# Patient Record
Sex: FEMALE | Race: BLACK OR AFRICAN AMERICAN | NOT HISPANIC OR LATINO | Employment: FULL TIME | URBAN - METROPOLITAN AREA
[De-identification: names, ages, dates, MRNs, and addresses within clinical notes are randomized per-mention and may not be internally consistent; named-entity substitution may affect disease eponyms.]

---

## 2018-08-17 ENCOUNTER — TELEPHONE (OUTPATIENT)
Dept: OBSTETRICS AND GYNECOLOGY | Facility: CLINIC | Age: 33
End: 2018-08-17

## 2018-08-17 ENCOUNTER — HOSPITAL ENCOUNTER (OUTPATIENT)
Dept: RADIOLOGY | Facility: OTHER | Age: 33
Discharge: HOME | End: 2018-08-17
Attending: OBSTETRICS & GYNECOLOGY
Payer: COMMERCIAL

## 2018-08-17 ENCOUNTER — OFFICE VISIT (OUTPATIENT)
Dept: OBSTETRICS AND GYNECOLOGY | Facility: CLINIC | Age: 33
End: 2018-08-17
Attending: OBSTETRICS & GYNECOLOGY
Payer: COMMERCIAL

## 2018-08-17 VITALS
SYSTOLIC BLOOD PRESSURE: 126 MMHG | DIASTOLIC BLOOD PRESSURE: 88 MMHG | HEIGHT: 64 IN | WEIGHT: 211.88 LBS | BODY MASS INDEX: 36.17 KG/M2

## 2018-08-17 DIAGNOSIS — D25.2 INTRAMURAL AND SUBSEROUS LEIOMYOMA OF UTERUS: ICD-10-CM

## 2018-08-17 DIAGNOSIS — D25.2 INTRAMURAL AND SUBSEROUS LEIOMYOMA OF UTERUS: Primary | ICD-10-CM

## 2018-08-17 DIAGNOSIS — D25.1 INTRAMURAL AND SUBSEROUS LEIOMYOMA OF UTERUS: ICD-10-CM

## 2018-08-17 DIAGNOSIS — D25.1 INTRAMURAL AND SUBSEROUS LEIOMYOMA OF UTERUS: Primary | ICD-10-CM

## 2018-08-17 PROCEDURE — 76830 TRANSVAGINAL US NON-OB: CPT | Mod: TC

## 2018-08-17 PROCEDURE — 76830 TRANSVAGINAL US NON-OB: CPT | Mod: 26,,, | Performed by: RADIOLOGY

## 2018-08-17 PROCEDURE — 76856 US EXAM PELVIC COMPLETE: CPT | Mod: 26,,, | Performed by: RADIOLOGY

## 2018-08-17 PROCEDURE — 99204 OFFICE O/P NEW MOD 45 MIN: CPT | Mod: S$GLB,,, | Performed by: OBSTETRICS & GYNECOLOGY

## 2018-08-17 PROCEDURE — 99999 PR PBB SHADOW E&M-NEW PATIENT-LVL III: CPT | Mod: PBBFAC,,, | Performed by: OBSTETRICS & GYNECOLOGY

## 2018-08-17 PROCEDURE — 3008F BODY MASS INDEX DOCD: CPT | Mod: CPTII,S$GLB,, | Performed by: OBSTETRICS & GYNECOLOGY

## 2018-08-17 RX ORDER — FUROSEMIDE 20 MG/1
20 TABLET ORAL 2 TIMES DAILY
COMMUNITY

## 2018-08-17 NOTE — PROGRESS NOTES
"CC: Symptoms related to fibroids    Alden Hanna is a 33 y.o. female  presents for a consultation for management of fibroids.      She reports cycles have gotten heavier in the last 6 months.  She has to use pads and tampons due to the heaviest.  She reports clots.  She has accidents.  She reports mild dysmenorrhea.    She reports being diagnosed with fibroids approximately 2-3 years ago in [place].      History reviewed. No pertinent past medical history.    History reviewed. No pertinent surgical history.    Family History   Problem Relation Age of Onset    Stomach cancer Maternal Grandfather     Breast cancer Neg Hx     Colon cancer Neg Hx     Ovarian cancer Neg Hx        Social History     Tobacco Use    Smoking status: Current Every Day Smoker     Types: Cigars    Smokeless tobacco: Never Used   Substance Use Topics    Alcohol use: Yes     Comment: occasional    Drug use: No       OB History    Para Term  AB Living   0 0 0 0 0 0   SAB TAB Ectopic Multiple Live Births   0 0 0 0 0             /88 (BP Location: Left arm, Patient Position: Sitting, BP Method: Large (Manual))   Ht 5' 4" (1.626 m)   Wt 96.1 kg (211 lb 13.8 oz)   LMP 2018 (Exact Date)   BMI 36.37 kg/m²     ROS:  GENERAL: Denies weight gain or weight loss. Feeling well overall.   SKIN: Denies rash or lesions.   HEAD: Denies head injury or headache.   NODES: Denies enlarged lymph nodes.   CHEST: Denies chest pain or shortness of breath.   CARDIOVASCULAR: Denies palpitations or left sided chest pain.   ABDOMEN: No abdominal pain, constipation, diarrhea, nausea, vomiting or rectal bleeding.   URINARY: No frequency, dysuria, hematuria, or burning on urination.  REPRODUCTIVE: See HPI.   HEMATOLOGIC: No easy bruisability or excessive bleeding with the exception of menstrual cycles.  MUSCULOSKELETAL: Denies joint pain or swelling.   NEUROLOGIC: Denies syncope or weakness.   PSYCHIATRIC: Denies depression, anxiety " or mood swings.    PHYSICAL EXAM:  APPEARANCE: Well nourished, well developed, in no acute distress.  AFFECT: WNL, alert and oriented x 3  SKIN: No acne or hirsutism  NECK: Neck symmetric without masses or thyromegaly  NODES: No inguinal, cervical, axillary, or femoral lymph node enlargement  CHEST: Good respiratory effect  ABDOMEN: Soft.  No tenderness or masses.  No hepatosplenomegaly.  No hernias.  PELVIC: Normal external genitalia without lesions.  Normal hair distribution.  Adequate perineal body, normal urethral meatus.  Vagina moist and well rugated without lesions or discharge.  Cervix pink, without lesions, discharge or tenderness.  No significant cystocele or rectocele.  Bimanual exam shows uterus to be enlarged, approximately 16 week size, irregular, mobile and nontender.  Adnexa without masses or tenderness.    EXTREMITIES: No edema.      ICD-10-CM ICD-9-CM    1. Intramural and subserous leiomyoma of uterus D25.1 218.1 TSH    D25.2 218.2 CBC auto differential      US Pelvis Comp with Transvag NON-OB (xpd         Patient was counseled today on treatment options for fibroids including low dose oral contraceptive pills, DepoLupron, Elagolix clinical trial, UFE, DaVinci assisted myomectomy, abdominal myomectomy, and hysterectomy.  R/B/A of each discussed.  Above test ordered.  Will email with results of above to determine the best course of action.

## 2018-08-17 NOTE — PATIENT INSTRUCTIONS
What Are Fibroids?  Fibroids are growths made up of connective tissue and muscle cells that usually form in the wall of your uterus. Other names for fibroids are myomas and leiomyomas. Fibroids are the most common tumor in women. They are almost always noncancerous (benign) and harmless. Fibroids start as pea-sized lumps, but can grow steadily during your reproductive years. Many fibroids just need to be watched. Others may need treatment if they become too large or cause symptoms.    Potential problems  Fibroids often cause no symptoms. But a fibroid that grows quickly in your uterus can cause 1 or more of the following problems:  · Excessive uterine bleeding, leading to anemia (lack of red blood cells)  · Frequent urge to urinate  · Difficulty having bowel movements  · Achiness, heaviness, or fullness  · Back or abdominal pain  · Pain during intercourse  · Difficulty getting pregnant or being unable to get pregnant  · Problems with pregnancy  · Enlargement of the lower abdomen  Treatment is tailored for you  No 2 fibroids are the same. The type of treatment you will have depends on their number, size, location, and rate of growth. Your treatment decision also depends on the severity of your symptoms and whether or not you plan to have children in the future. There are a growing number of effective ways to treat fibroids. After your medical evaluation, your health care provider will be able to discuss with you the best options to solve your particular problem and meet your needs.  Your future checkups  Treating your fibroids is likely to relieve your symptoms. But your health care provider will want to check your progress. Ask your health care provider about any additional follow-up visits you might need.   Date Last Reviewed: 5/10/2015  © 2338-6315 [employer]. [address] [place] [address], [place] [address]. All rights reserved. This information is not intended as a substitute for professional medical  care. Always follow your healthcare professional's instructions.        Having Robotic-Assisted Myomectomy  Robotic-assisted myomectomy is a type of surgery. Its done to remove growths in a womens womb (uterus) called fibroid tumors. The tumors are not cancer. The surgery is done with special tools and a robotic controller.  What to tell your healthcare provider  Tell your healthcare provider about all the medicines you take. This includes over-the-counter medicines such as ibuprofen. It also includes vitamins, herbs, and other supplements. And tell your healthcare provider if you:  · Have had any recent changes in your health, such as an infection or fever  · Are sensitive or allergic to any medicines, latex, tape, or anesthesia (local and general)  · Are pregnant or think you may be pregnant  · Plan to get pregnant after having this surgery  Tests before your surgery  Before your surgery, a healthcare provider will ask you questions about your health. He or she will also examine you. This includes checking your heart and lungs. You may need tests such as:  · Electrocardiogram to check your heart rhythm  · Ultrasound exam of your pelvis to look at your fibroids  · MRI to get more information about your fibroids  · Blood tests to check your overall health  Getting ready for your surgery  Talk with your healthcare provider how to get ready for your surgery. You may need to stop taking some medicines before the procedure, such as blood thinners and aspirin. If you smoke, you may need to stop before your surgery. Smoking can delay healing. Talk with your healthcare provider if you need help to stop smoking.  Also, make sure to:  · Ask a family member or friend to take you home from the hospital  · Not eat or drink after midnight the night before your surgery  · Follow all other instructions from your healthcare provider  You will be asked to sign a consent form that gives your permission to do the procedure. Read the  form carefully. Ask questions if something is not clear.  On the day of your surgery  Your doctor will explain the details of your surgery. Your surgery will be done by an obstetrician/gynecologist (OB/GYN) surgeon. He or she will work with a team of specialized nurses. The surgery can be done in several ways. Ask your doctor about the details of your surgery. The whole procedure may take a couple of hours. In general, you can expect the following:  · You will have general anesthesia, a medicine that allows you to sleep through the surgery. You wont feel any pain during the surgery.  · A healthcare provider will watch your vital signs, like your heart rate and blood pressure, during the surgery.  · You may be given antibiotics during and after the surgery. This is to help prevent infection.  · After cleaning your skin, the surgeon will make a few small cuts (incisions) in your abdomen.  · A small tube may be used to send some gas into your abdomen. This helps the surgeon see the area better during surgery.  · the surgeon will pass tools through the small incisions. These include a tiny camera with a light, and several robotic tools. The robotic tools allow the surgeon to make very small movements.  · The surgeon will use the robotic controller to move the tools and remove the fibroids.  · When the surgery is done, the tools will be removed. The incisions will be closed and bandaged.  After your surgery  After the surgery, a healthcare provider will watch your vital signs. He or she will also check your incisions. You may be able to go home the same day. Or you may need to stay overnight.  Recovering at home  Make sure you move around as much as possible after your surgery. This helps to prevent problems like blood clots. You may have some pain after the surgery. This includes pain in your shoulder from the gas used during surgery. Your healthcare provider will tell you what to take for pain.  Follow-up care  Make  sure you follow all of your healthcare providers instructions. Dont miss any of your follow-up appointments. Your fibroid symptoms may go away after surgery. Fibroids can grow back or new ones can form. Talk with your healthcare provider if your symptoms return. Tell your provider if you get pregnant after having this surgery.  When to call your healthcare provider  Call your healthcare provider right away if you have any of these:  · Fever of 100.4°F (38.0°C) or higher  · Pain that is getting worse  · Redness or warmth near the incisions  · Vaginal bleeding  · Lots of fluid leaking from your incisions   Date Last Reviewed: 8/10/2015  © 9834-9389 [employer]. [address] [place] [address], [place] [address]. All rights reserved. This information is not intended as a substitute for professional medical care. Always follow your healthcare professional's instructions.        Myomectomy  Myomectomy is a surgical procedure to remove uterine fibroids. This procedure may preserve your uterus and your ability to have children.  Before your surgery  Depending on which procedure you and your healthcare provider choose, you may be asked to do the following:  · Have tests that your health care provider has ordered.  · Stop eating and drinking at midnight before your surgery, or as recommended by your healthcare provider.  · Take medicines as prescribed by your healthcare provider to shrink fibroids.  · Stop taking aspirin or ibuprofen 1 week before surgery. Tell your healthcare provider what other medicines you take and ask whether you should stop them.  · Arrange for a ride home after surgery.    Types of myomectomy procedures  Abdominal  Your healthcare provider makes incisions in your stomach and uterus to remove the fibroids. Then the uterus is repaired and the incisions are closed.  Laparoscopic  Your healthcare provider inserts a laparoscope and other surgical instruments through half-inch incisions in your  stomach. One or more incisions are made in your uterus to remove the fibroids. Then the uterus is repaired and the incisions are closed.  Hysteroscopic  A hysteroscope is inserted into the vagina. An instrument is used to remove the fibroids from your uterus.  Call your healthcare provider  Contact your healthcare provider right away if you have any of the following:  · Severe or increasing pain  · Fever or chills  · Nausea or vomiting  · Redness or swelling around your incision  · Persistent or heavy vaginal bleeding   Date Last Reviewed: 2/1/2017 © 2000-2017 [employer]. [address] [place] [address]. All rights reserved. This information is not intended as a substitute for professional medical care. Always follow your healthcare professional's instructions.

## 2018-08-20 ENCOUNTER — TELEPHONE (OUTPATIENT)
Dept: OBSTETRICS AND GYNECOLOGY | Facility: CLINIC | Age: 33
End: 2018-08-20

## 2018-08-20 NOTE — TELEPHONE ENCOUNTER
Contacted the pt to inform her of her US results per Dr. Stewart's note. Pt informed that the US shows multiple fibroids.  The largest seems to be about 5 cm.Pt informed that if she is interested in considering a robotic myomectomy, Dr. Stewart will need to order an MRI. Pt stated that she would like to have the MRI done. Pt informed that Dr. Stewart will be informed. Pt stated that she will call the office back to schedule the appointment. Pt verbalized understanding.

## 2018-08-24 ENCOUNTER — TELEPHONE (OUTPATIENT)
Dept: OBSTETRICS AND GYNECOLOGY | Facility: CLINIC | Age: 33
End: 2018-08-24

## 2018-08-24 DIAGNOSIS — D25.1 INTRAMURAL AND SUBSEROUS LEIOMYOMA OF UTERUS: ICD-10-CM

## 2018-08-24 DIAGNOSIS — D25.2 INTRAMURAL AND SUBSEROUS LEIOMYOMA OF UTERUS: ICD-10-CM

## 2018-08-24 NOTE — TELEPHONE ENCOUNTER
----- Message from Rebecca Benjamin sent at 8/24/2018  9:33 AM CDT -----  Contact: ARTEM ROSS [[id number]]            Name of Who is Calling: ARTEM ROSS [[id number]]      What is the request in detail: patient would like codes procedures previously discuss . Please call     Can the clinic reply by [facility]: no    What Number to Call Back if not in [facility]:[phone]

## 2018-08-24 NOTE — TELEPHONE ENCOUNTER
Returned the pt's call to the clinic. Pt needed the CPT codes for the robotic myomectomy procedure 25540 and MRI of her pelvis 70617. Pt verbalized understanding.

## 2018-08-24 NOTE — TELEPHONE ENCOUNTER
Contacted the pt to inform her that her US shows multiple fibroids per Dr. Stewart. Informed the pt that the largest seems to be about 5 cm and If she is interested in considering a robotic myomectomy, Dr. Stewart will need to order an MRI. Pt informed that the MRI is ordered all she needs to do is schedule the appointment. Pt requested information about how much it'll cost for her to have the MRI and the robotic myomectomy. Informed the pt of patient artie number so that she can make her inquiries. Pt verbalized understanding.

## 2018-09-11 ENCOUNTER — TELEPHONE (OUTPATIENT)
Dept: OBSTETRICS AND GYNECOLOGY | Facility: CLINIC | Age: 33
End: 2018-09-11

## 2018-09-11 NOTE — TELEPHONE ENCOUNTER
----- Message from Marva Mon sent at 9/11/2018  8:45 AM CDT -----  Contact: pt            Name of Who is Calling: ARTEM ROSS [[id number]]    What is the request in detail: pt calling to schedule MRI.. Please advise      Can the clinic reply by [facility]: yes      What Number to Call Back if not in [facility]: [phone]

## 2018-09-11 NOTE — TELEPHONE ENCOUNTER
Hello, this is Juan Pablo calling from the OBGYN clinic at [facility]. Returning the pt's call to the clinic. ( No answer, unable to leave VM message. Mailbox was full. The order for the MRI is already placed the patient just needs to schedule the appointment. Please schedule the patient.)

## 2018-09-14 ENCOUNTER — OFFICE VISIT (OUTPATIENT)
Dept: OBSTETRICS AND GYNECOLOGY | Facility: CLINIC | Age: 33
End: 2018-09-14
Attending: OBSTETRICS & GYNECOLOGY
Payer: COMMERCIAL

## 2018-09-14 VITALS
BODY MASS INDEX: 36.4 KG/M2 | WEIGHT: 213.19 LBS | DIASTOLIC BLOOD PRESSURE: 82 MMHG | HEIGHT: 64 IN | SYSTOLIC BLOOD PRESSURE: 112 MMHG

## 2018-09-14 DIAGNOSIS — D25.0 INTRAMURAL, SUBMUCOUS, AND SUBSEROUS LEIOMYOMA OF UTERUS: ICD-10-CM

## 2018-09-14 DIAGNOSIS — D25.2 INTRAMURAL, SUBMUCOUS, AND SUBSEROUS LEIOMYOMA OF UTERUS: ICD-10-CM

## 2018-09-14 DIAGNOSIS — D25.1 INTRAMURAL, SUBMUCOUS, AND SUBSEROUS LEIOMYOMA OF UTERUS: ICD-10-CM

## 2018-09-14 DIAGNOSIS — Z01.419 VISIT FOR GYNECOLOGIC EXAMINATION: Primary | ICD-10-CM

## 2018-09-14 PROCEDURE — 99395 PREV VISIT EST AGE 18-39: CPT | Mod: S$GLB,,, | Performed by: OBSTETRICS & GYNECOLOGY

## 2018-09-14 PROCEDURE — 88175 CYTOPATH C/V AUTO FLUID REDO: CPT

## 2018-09-14 PROCEDURE — 87624 HPV HI-RISK TYP POOLED RSLT: CPT

## 2018-09-14 PROCEDURE — 99999 PR PBB SHADOW E&M-EST. PATIENT-LVL III: CPT | Mod: PBBFAC,,, | Performed by: OBSTETRICS & GYNECOLOGY

## 2018-09-14 NOTE — PROGRESS NOTES
"CC: Well woman exam    Alden Hanna is a 33 y.o. female  presents for a well woman exam.  She has no issues, problems, or complaints.    She was previously seen for symptoms related to fibroids.      She reports cycles have gotten heavier in the last 6 months.  She has to use pads and tampons due to the heaviest.  She reports clots.  She has accidents.  She reports mild dysmenorrhea.     Ultrasound showed:    FINDINGS:  The uterus is heterogeneous in echotexture and enlarged, measuring 10.1 x 5.2 x 4.8 cm.  The endometrium is unremarkable, measuring 0.6 cm in thickness.  The cervix is unremarkable.    The uterus demonstrates several intramural fibroids.  The largest is in the left aspect of the lower uterine pole, measuring 5.1 cm.    The ovaries are unremarkable.  The right ovary measures 3.3 x 4.1 x 2.0 cm, and the left measures 3.5 x 2.9 x 3.0 cm.    Duplex Doppler images show symmetric and expected bilateral ovarian vascularity.            History reviewed. No pertinent past medical history.    History reviewed. No pertinent surgical history.    OB History    Para Term  AB Living   0 0 0 0 0 0   SAB TAB Ectopic Multiple Live Births   0 0 0 0 0             Family History   Problem Relation Age of Onset    Stomach cancer Maternal Grandfather     Breast cancer Neg Hx     Colon cancer Neg Hx     Ovarian cancer Neg Hx        Social History     Tobacco Use    Smoking status: Current Every Day Smoker     Types: Cigars    Smokeless tobacco: Never Used   Substance Use Topics    Alcohol use: Yes     Comment: occasional    Drug use: No       /82 (BP Location: Left arm, Patient Position: Sitting, BP Method: Large (Manual))   Ht 5' 4" (1.626 m)   Wt 96.7 kg (213 lb 3 oz)   LMP 2018   BMI 36.59 kg/m²     ROS:  GENERAL: Denies weight gain or weight loss. Feeling well overall.   SKIN: Denies rash or lesions.   HEAD: Denies head injury or headache.   NODES: Denies enlarged lymph " nodes.   CHEST: Denies chest pain or shortness of breath.   CARDIOVASCULAR: Denies palpitations or left sided chest pain.   ABDOMEN: No abdominal pain, constipation, diarrhea, nausea, vomiting or rectal bleeding.   URINARY: No frequency, dysuria, hematuria, or burning on urination.  REPRODUCTIVE: See HPI.   BREASTS: The patient performs breast self-examination and denies pain, lumps, or nipple discharge.   HEMATOLOGIC: No easy bruisability or excessive bleeding.  MUSCULOSKELETAL: Denies joint pain or swelling.   NEUROLOGIC: Denies syncope or weakness.   PSYCHIATRIC: Denies depression, anxiety or mood swings.    Physical Exam:    APPEARANCE: Well nourished, well developed, in no acute distress.  AFFECT: WNL, alert and oriented x 3  SKIN: No acne or hirsutism  NECK: Neck symmetric without masses or thyromegaly  NODES: No inguinal, cervical, axillary, or femoral lymph node enlargement  CHEST: Good respiratory effect  ABDOMEN: Soft.  No tenderness or masses.  No hepatosplenomegaly.  No hernias.  BREASTS: Symmetrical, no skin changes or visible lesions.  No palpable masses, nipple discharge bilaterally.  PELVIC: Normal external genitalia without lesions.  Normal hair distribution.  Adequate perineal body, normal urethral meatus.  Vagina moist and well rugated without lesions or discharge.  Cervix pink, without lesions, discharge or tenderness.  No significant cystocele or rectocele.  Bimanual exam shows uterus to be normal size, regular, mobile and nontender.  Adnexa without masses or tenderness.    EXTREMITIES: No edema.    ASSESSMENT AND PLAN  1. Visit for gynecologic examination  Liquid-based pap smear, screening    HPV High Risk Genotypes, PCR   2. Intramural, submucous, and subserous leiomyoma of uterus         Patient was counseled today on A.C.S. Pap guidelines and recommendations for yearly pelvic exams, pap smears every 5 years, and monthly self breast exams; to see her PCP for other health maintenance.      Patient was counseled today on treatment options for fibroids including low dose oral contraceptive pills, DepoLupron, Elagolix clinical trial, UFE, DaVinci assisted myomectomy, abdominal myomectomy, and hysterectomy.  R/B/A of each discussed.  Patient is interested in robotic   Myomectomy.  MRI ordered to determine if she is a candidate.  Discussed the risk of recurrence of fibroids, the need to delay conception for 4-6 months and the possible need for  for deliver    Follow-up in about 1 year (around 2019).

## 2018-09-19 LAB
HPV HR 12 DNA CVX QL NAA+PROBE: NEGATIVE
HPV16 AG SPEC QL: NEGATIVE
HPV18 DNA SPEC QL NAA+PROBE: NEGATIVE

## 2018-09-21 ENCOUNTER — HOSPITAL ENCOUNTER (OUTPATIENT)
Dept: RADIOLOGY | Facility: OTHER | Age: 33
Discharge: HOME | End: 2018-09-21
Attending: OBSTETRICS & GYNECOLOGY
Payer: COMMERCIAL

## 2018-09-21 DIAGNOSIS — D25.2 INTRAMURAL AND SUBSEROUS LEIOMYOMA OF UTERUS: ICD-10-CM

## 2018-09-21 DIAGNOSIS — D25.1 INTRAMURAL AND SUBSEROUS LEIOMYOMA OF UTERUS: ICD-10-CM

## 2018-09-21 PROCEDURE — 72197 MRI PELVIS W/O & W/DYE: CPT | Mod: TC

## 2018-09-21 PROCEDURE — 72197 MRI PELVIS W/O & W/DYE: CPT | Mod: 26,,, | Performed by: RADIOLOGY

## 2018-09-21 PROCEDURE — 25500020 PHARM REV CODE 255: Performed by: OBSTETRICS & GYNECOLOGY

## 2018-09-21 PROCEDURE — A9585 GADOBUTROL INJECTION: HCPCS | Performed by: OBSTETRICS & GYNECOLOGY

## 2018-09-21 RX ORDER — GADOBUTROL 604.72 MG/ML
10 INJECTION INTRAVENOUS
Status: COMPLETED | OUTPATIENT
Start: 2018-09-21 | End: 2018-09-21

## 2018-09-21 RX ADMIN — GADOBUTROL 10 ML: 604.72 INJECTION INTRAVENOUS at 04:09

## 2019-05-30 ENCOUNTER — TELEPHONE (OUTPATIENT)
Dept: RESEARCH | Facility: OTHER | Age: 34
End: 2019-05-30

## 2019-05-30 ENCOUNTER — OFFICE VISIT (OUTPATIENT)
Dept: OBSTETRICS AND GYNECOLOGY | Facility: CLINIC | Age: 34
End: 2019-05-30
Attending: OBSTETRICS & GYNECOLOGY
Payer: COMMERCIAL

## 2019-05-30 VITALS
SYSTOLIC BLOOD PRESSURE: 112 MMHG | BODY MASS INDEX: 33.69 KG/M2 | WEIGHT: 197.31 LBS | DIASTOLIC BLOOD PRESSURE: 74 MMHG | HEIGHT: 64 IN

## 2019-05-30 DIAGNOSIS — N63.10 BREAST MASS, RIGHT: Primary | ICD-10-CM

## 2019-05-30 DIAGNOSIS — N92.0 MENORRHAGIA WITH REGULAR CYCLE: ICD-10-CM

## 2019-05-30 PROCEDURE — 99999 PR PBB SHADOW E&M-EST. PATIENT-LVL III: ICD-10-PCS | Mod: PBBFAC,,, | Performed by: OBSTETRICS & GYNECOLOGY

## 2019-05-30 PROCEDURE — 99213 PR OFFICE/OUTPT VISIT, EST, LEVL III, 20-29 MIN: ICD-10-PCS | Mod: S$GLB,,, | Performed by: OBSTETRICS & GYNECOLOGY

## 2019-05-30 PROCEDURE — 99213 OFFICE O/P EST LOW 20 MIN: CPT | Mod: S$GLB,,, | Performed by: OBSTETRICS & GYNECOLOGY

## 2019-05-30 PROCEDURE — 3008F BODY MASS INDEX DOCD: CPT | Mod: CPTII,S$GLB,, | Performed by: OBSTETRICS & GYNECOLOGY

## 2019-05-30 PROCEDURE — 3008F PR BODY MASS INDEX (BMI) DOCUMENTED: ICD-10-PCS | Mod: CPTII,S$GLB,, | Performed by: OBSTETRICS & GYNECOLOGY

## 2019-05-30 PROCEDURE — 99999 PR PBB SHADOW E&M-EST. PATIENT-LVL III: CPT | Mod: PBBFAC,,, | Performed by: OBSTETRICS & GYNECOLOGY

## 2019-05-30 RX ORDER — TRANEXAMIC ACID 650 MG/1
1300 TABLET ORAL 3 TIMES DAILY
Qty: 30 TABLET | Refills: 0 | Status: SHIPPED | OUTPATIENT
Start: 2019-05-30 | End: 2019-06-04

## 2019-05-30 NOTE — PROGRESS NOTES
"CC: Breast mass    Alden Hanna is a 34 y.o. female  presents for a consultation for management of fibroids and breast mass.      She reports a mass in her right breast since April.  No nipple discharge.  Had tenderness but has improved.      She reports cycles are still heavy and interested in discussing medication options again.      History reviewed. No pertinent past medical history.    History reviewed. No pertinent surgical history.    Family History   Problem Relation Age of Onset    Stomach cancer Maternal Grandfather     Breast cancer Neg Hx     Colon cancer Neg Hx     Ovarian cancer Neg Hx        Social History     Tobacco Use    Smoking status: Current Every Day Smoker     Types: Cigars    Smokeless tobacco: Never Used   Substance Use Topics    Alcohol use: Yes     Comment: occasional    Drug use: No       OB History    Para Term  AB Living   0 0 0 0 0 0   SAB TAB Ectopic Multiple Live Births   0 0 0 0 0       /74   Ht 5' 4" (1.626 m)   Wt 89.5 kg (197 lb 5 oz)   LMP 2019   BMI 33.87 kg/m²     ROS:  GENERAL: Denies weight gain or weight loss. Feeling well overall.   SKIN: Denies rash or lesions.   HEAD: Denies head injury or headache.   NODES: Denies enlarged lymph nodes.   CHEST: Denies chest pain or shortness of breath.   BREASTS: The patient performs breast self-examination. Reports a painful lump in the right breast, no nipple discharge.   HEMATOLOGIC: No easy bruisability or excessive bleeding with the exception of menstrual cycles.    PHYSICAL EXAM:  APPEARANCE: Well nourished, well developed, in no acute distress.  AFFECT: WNL, alert and oriented x 3  SKIN: No acne or hirsutism  NECK: Neck symmetric without masses or thyromegaly  NODES: No inguinal, cervical, axillary, or femoral lymph node enlargement  CHEST: Good respiratory effect  BREASTS: Symmetrical, no skin changes or visible lesions.  No palpable masses, nipple discharge bilaterally.      " ICD-10-CM ICD-9-CM    1. Breast mass, right N63.10 611.72 US Breast Right Complete   2. Menorrhagia with regular cycle N92.0 626.2 tranexamic acid (LYSTEDA) 650 mg tablet     No palpable mass but ultrasound ordered to rule out abnormalities.      Discussed medication options for menorrhagia/fibroids - OCP, Lysteda, Elagolix clinical trial.  She is interested in the clinical trial but would like to start Lysteda until then.  R/B of each discussed.

## 2019-05-30 NOTE — TELEPHONE ENCOUNTER
As a follow-up to Dr. Stewart's request, BUSTER Mccarthy contacted patient to discuss [employer]  Uterine Fibroids Research Study. Subject expressed an interest.  Consent and Screening scheduled for 13 Jun 2019 @ 12:00 noon.

## 2019-06-12 ENCOUNTER — TELEPHONE (OUTPATIENT)
Dept: OBSTETRICS AND GYNECOLOGY | Facility: CLINIC | Age: 34
End: 2019-06-12

## 2019-06-13 ENCOUNTER — RESEARCH ENCOUNTER (OUTPATIENT)
Dept: RESEARCH | Facility: OTHER | Age: 34
End: 2019-06-13

## 2019-06-13 ENCOUNTER — HOSPITAL ENCOUNTER (OUTPATIENT)
Dept: RADIOLOGY | Facility: OTHER | Age: 34
Discharge: HOME | End: 2019-06-13
Attending: OBSTETRICS & GYNECOLOGY
Payer: COMMERCIAL

## 2019-06-13 ENCOUNTER — OFFICE VISIT (OUTPATIENT)
Dept: OBSTETRICS AND GYNECOLOGY | Facility: CLINIC | Age: 34
End: 2019-06-13
Attending: OBSTETRICS & GYNECOLOGY
Payer: COMMERCIAL

## 2019-06-13 VITALS
WEIGHT: 200.63 LBS | WEIGHT: 200.38 LBS | DIASTOLIC BLOOD PRESSURE: 78 MMHG | RESPIRATION RATE: 16 BRPM | HEIGHT: 64 IN | SYSTOLIC BLOOD PRESSURE: 116 MMHG | HEART RATE: 80 BPM | HEIGHT: 64 IN | SYSTOLIC BLOOD PRESSURE: 120 MMHG | BODY MASS INDEX: 34.21 KG/M2 | BODY MASS INDEX: 34.25 KG/M2 | TEMPERATURE: 99 F | DIASTOLIC BLOOD PRESSURE: 80 MMHG

## 2019-06-13 DIAGNOSIS — Z00.6 RESEARCH STUDY PATIENT: Primary | ICD-10-CM

## 2019-06-13 DIAGNOSIS — N63.10 BREAST MASS, RIGHT: ICD-10-CM

## 2019-06-13 PROCEDURE — 99499 UNLISTED E&M SERVICE: CPT | Mod: S$GLB,,, | Performed by: OBSTETRICS & GYNECOLOGY

## 2019-06-13 PROCEDURE — 99999 PR PBB SHADOW E&M-EST. PATIENT-LVL III: ICD-10-PCS | Mod: PBBFAC,,, | Performed by: OBSTETRICS & GYNECOLOGY

## 2019-06-13 PROCEDURE — 76642 US BREAST RIGHT LIMITED: ICD-10-PCS | Mod: 26,RT,, | Performed by: RADIOLOGY

## 2019-06-13 PROCEDURE — 99499 NO LOS: ICD-10-PCS | Mod: S$GLB,,, | Performed by: OBSTETRICS & GYNECOLOGY

## 2019-06-13 PROCEDURE — 76642 ULTRASOUND BREAST LIMITED: CPT | Mod: 26,RT,, | Performed by: RADIOLOGY

## 2019-06-13 PROCEDURE — 99999 PR PBB SHADOW E&M-EST. PATIENT-LVL III: CPT | Mod: PBBFAC,,, | Performed by: OBSTETRICS & GYNECOLOGY

## 2019-06-13 PROCEDURE — 76642 ULTRASOUND BREAST LIMITED: CPT | Mod: TC,RT

## 2019-06-13 NOTE — PROGRESS NOTES
Date:  13 JUN 2019 @ 2:00pm  Study:  Phase 3b Study to Evaluate the Long-Term Safety and Efficacy of Elagolix in Combination with Estradiol/Norethindrone Acetate for the Management of Heavy Menstrual Bleeding Associated with Uterine Fibroids in Premenopausal Women.           Sponsor:  [employer]   IRB/Protocol #: 2017.414 (Approved 01/22/2019)  : Lisa Stewart MD  Sub-Investigators:  Magnus Roa MD and Ant Briscoe MD  Site Number: 767   Visit:  Consent & Initial Screening Study Visit       Assessment/Procedure for Consent & Initial Screening Visit on 13 JUN 2019 @ 2:00pm  Exam Room #10     Informed Consent Documentation:  Before any activities or procedures were conducted, BUSTER Mccarthy met with Alden Hanna on 13-JUN-2019 @ 2:00pm in the Clinical Trials Unit (CTU), Exam Room #10 to consent Subject for [employer]   (IRB #: 2017.414 approved 01/22/2019) a Phase 3b Study to Evaluate the Long-Term Safety and Efficacy of Elagolix in Combination with Estradiol/Norethindrone Acetate for the Management of Heavy Menstrual Bleeding Associated with Uterine Fibroids in Premenopausal Women. BUSTER Mccarthy allowed ample time for subject to review consent and ask questions before signing consent.  The Subject verbally agreed to continue participating in the study and signed Informed Consent.    A copy of signed consent was given to Subject and uploaded into Epic.    Demographics:  YOB: 1975  Race:  Black or   Ethnicity:  Not  or     Tobacco and Alcohol Use:  Tobacco Use:  Current use of: Cigars (3 times a year)   Alcohol Use:  Number of drinks/Week consumed 1    Non-Gynecological Medical/Surgical History:  Condition/Diagnosis:  Lesion    Specify:  Lesion removed off of left ankle    Onset Date:  Fall 1996    Ongoing?  No.  Date:  Fall 1996    Is subject currently requiring treatment?  No    Gynecological Medical/Surgical History:   Abnormal Pap smear    Onset Date:  MAR 2004     Currently Symptomatic or Requiring Treatment?  No  Dysmenorrhea    Onset Date:  2015    Currently Symptomatic or Requiring Treatment?  Yes  Fibroids    Onset Date:  MAR 2015     Currently Symptomatic or Requiring Treatment?  Yes  Menorrhagia/heavy menstrual bleeding    Onset Date:  OCT 2018    Currently Symptomatic or Requiring Treatment?  Yes  Pelvic cramping    Onset Date:  OCT 2018    Currently Symptomatic or Requiring Treatment?  Yes  Pelvic pain    Onset Date:  OCT 2018    Currently Symptomatic or Requiring Treatment?  Yes  Pelvic pressure    Onset Date:  OCT 2018    Currently Symptomatic or Requiring Treatment?  Yes    Menstrual/Obstetrical History:    Average cycle length over last 6 months:  28 days.  Number of days bleedin    Typical intensity of menstrual periods:  Heavy    Has the subject ever been pregnant?  No    Complete Physical Examination:  Assessment conducted by Dr. Lisa Stewart on 2019 @ 4:00pm.  See Dr. Stewart's Epic note.  Appearance: Normal  Skin:  Normal  EENT: Normal   Head/Neck/Thyroid: Normal  Lymphatic: Normal  Cardiovascular: Normal  Lungs/Chest: Normal  Abdomen: Normal  Genitourinary:  Gynecological (pelvic/breast):  Refer to Gynecological Exam:  Extremities: Normal  Musculoskeletal: Normal  Neurologic: Normal  Other abnormalities, Please specify:  None.     Gynecological Examination:  Assessment conducted by Dr. Lisa Stewart on 2019 @ 4:00pm.  Please see her notes in Epic.      Site/System Appearance      Assessment      Breast                                      Normal   Pelvic                                       Abnormal; Enlarged Uterus            External Genitalia                    Normal     Vital Signs Assessed on 2019 at 2:53pm:  Systolic blood pressure:  Mm Hg 120  Diastolic blood pressure:  Mm Hg 80  Pulse:  80 beats/min  Respiratory Rate: 16 (breaths/min)  Temperature 98.5 F  Weight:  " 90.9kg  200 lb 6.4oz  Height:  5'4"in (165.5 cm)    12-lead Electrocardiogram (to be collected prior to scheduled blood collections:  12-lead Electrocardiogram will be performed at later date to be determined.    Mammogram (Subject who will be 39 years of age or older at the time of randomization, unless a mammogram was performed within 3 months prior to Screening).  Subject is 34 years of age and will not require a Mammogram to be performed.     Pap Test:  Pap Test was performed by Dr. Lisa Stewart on 13 JUN 2019 @ 4:00pm.  Please refer to her Epic Notes.  Pap test sample was collected.  Due to late time of examination performed, sample was collected and processed according to the central laboratory manual and shipped via [employer], on 14-JUN-2019.  Tracking #: [id number]  [id number]; Confirmation #: [id number].   Lab results will be placed in chart once received from [facility].     Endometrial Biopsy (Subject must have confirmed negative pregnancy test within 24 hours prior to biopsy:  Subject had a negative urine pregnancy test within 24 hours prior to the biopsy.  Endometrial Biopsy was conducted by Dr. Lisa Adamson on 13 JUN 2019 @ 4:00pm.  Please refer to her Epic Note.  Due to late time of examination performed, sample was collected and processed according to the central laboratory manual and shipped via [facility], on 14-JUN-2019.  Tracking #: [id number]  [id number]; Confirmation #: [id number].   Lab results will be placed in chart once received from [facility].       Pelvic Ultrasound /TAU, TVU:  Pelvic Ultrasound / TAU/TVU was conducted by Mai Gramajo in [address] after Dr. Stewart completed her examinations.  Images were uploaded and submitted per protocol to [facility].  Results of images once received will be filed in subject's chart.    Saline Infusion Sonohysterography (SIS) (Subject must have confirmed negative pregnancy test within 24 hours prior to SIS).  SIS will be performed at later date to be " determined.      MRI (If requested by Central Imaging Vendor):  N/A.    Bone Mineral Density (BMD) DXA Scan:  DXA will be performed at later date to be determined.    Dispense Sanitary Products and Collection Kit (Keg, Collection Bags, etc.:  Subject was dispensed the follow sanitary products.  Tampons Super- (1 Box); Tampons regular (2 Boxes); Tampons Super Plus (2 Boxes).  Always Ultra Thin Overnight- (2 packs); [place] panti-liner extra long- (1 Pack).  Subject was dispensed 1- collection kit (keg, collection bags and form).  BUSTER Mccarthy reviewed the product collection forms and explained the product collection visits with Subject.  Subject verbally expressed that she understood.    Optional Urine Test for Gonorrhea and Chlamydia (based on Investigator's discretion prior to subject undergoing endometrial biopsy.  Positive results will be treated outside of the protocol:  N/A     Clinical Laboratory/Safety Lab Tests:  Subject's samples were drawn at 2:25pm.  Subject did not fast.  Hematology, Chemistry, Vitamin D, Urinalysis, Lipid Panel, Serology, Endocrine (FSH & Reflexive TSH).  All ambient samples were processed according to the central laboratory manual and shipped via [email or web address] on 13 JUN 2019.  Tracking #: [id number]  [id number]; Confirmation #: [id number].  Frozen samples: 25-Hydroxy Vitamin D and HCV RNA Ampliprep Taqman were shipped via [facility] [[email or web address]], Tracking #: [id number]; Confirmation #: [id number]     Urine Pregnancy Test:  Pregnancy test conducted at 2:05pm; result- negative.  hcg Cassette Rapid Test  Catalog Number:  [id number]  Lot #: [id number]   Expiration Date: 2020-02-29.  Subject was dispensed home pregnancy test kits to self-administer during the Screening Period.    Serum Pregnancy Test:  N/A.     Contraception Counseling/Dispense Contraceptives:   Subject is not required to use dual contraception methods because she practices total abstinence from sexual intercourse, as her preferred  lifestyle.  Subject did not want any contraceptives.  BUSTER Mccarthy reminded subject that she can not be pregnant while participating in the study.     Dispense E-Diary:  BUSTER Mccarthy gave Subject her [facility] Desire 320 eDiary.  BUSTER Mccarthy trained subject on how to use her eDiary. Subject displayed knowledge of navigating the eDiary and entering data.        [place] IRT (Interactive Response Technology):  BUSTER Mccarthy registered subject in [employer] with status entered as Screening.  Subject was assigned ID#:  [id number].     Serious Adverse Event (SANJUANA) and Protocol-Related Non-Serious Adverse Event (AE) Assessment: Subject did not report any AEs or SAEs during this visit.     Prior Uterine Fibroid Medication History (Hormonal and Non-Hormonal):  Subject reported that she is not on birth control.  Subject has not taken any Prior Uterine Fibroid Medication (Hormonal and Non-Hormonal during the last six months.     Concomitant Medication Review:  Concomitant medications were assessed and noted on Concomitant Medications Log.      C-SSRS:  BUSTER Mccarthy accessed C-SSRS on the [employer].  C-SSRS was completed.     Inclusion/Exclusion Criteria:  Will be completed throughout Screening Period.    Screen Failure Documentation:  Will be document if Subject is a screen failure.       ClinCard:  Subject was registered in FND with Subject ID#: [id number].  [place] was given Subject.   Stipend will be given for Initial Screening: $75; Pap smear: $50; Endometrial Biopsy: $50 and TVA/TVU: $50.  Total amount: $225.     Subject visit completed at 3:50pm. Subject denies any questions or concerns at this time.  BUSTER Mccarthy reminded Subject that the following procedures needed to be scheduled:  SIS and DXA.

## 2019-06-13 NOTE — PROGRESS NOTES
"CC: Menorrhagia, fibroids    Alden Hanna is a 34 y.o. female  presents for initial exam for Phase III Clinical trial for Elagolix.      Patient reports a history of heavy menstrual periods, pelvic pressure and fullness.    History reviewed. No pertinent past medical history.    History reviewed. No pertinent surgical history.    OB History    Para Term  AB Living   0 0 0 0 0 0   SAB TAB Ectopic Multiple Live Births   0 0 0 0 0       Family History   Problem Relation Age of Onset    Stomach cancer Maternal Grandfather     Breast cancer Neg Hx     Colon cancer Neg Hx     Ovarian cancer Neg Hx        Social History     Tobacco Use    Smoking status: Current Every Day Smoker     Types: Cigars    Smokeless tobacco: Never Used   Substance Use Topics    Alcohol use: Yes     Comment: occasional    Drug use: No         /78 (BP Location: Right arm, Patient Position: Sitting, BP Method: Large (Manual))   Ht 5' 4" (1.626 m)   Wt 90.9 kg (200 lb 6.4 oz)   LMP 2019 (Exact Date)   BMI 34.40 kg/m²     ROS:  GENERAL: Denies weight gain or weight loss. Feeling well overall.   SKIN: Denies rash or lesions.   HEAD: Denies head injury or headache.   NODES: Denies enlarged lymph nodes.   CHEST: Denies chest pain or shortness of breath.   CARDIOVASCULAR: Denies palpitations or left sided chest pain.   ABDOMEN: No abdominal pain, constipation, diarrhea, nausea, vomiting or rectal bleeding.   URINARY: No frequency, dysuria, hematuria, or burning on urination.  REPRODUCTIVE: See HPI.   BREASTS: The patient performs breast self-examination and denies pain, lumps, or nipple discharge.   HEMATOLOGIC: No easy bruisability or excessive bleeding with the exception of menstrual cycles.  MUSCULOSKELETAL: Denies joint pain or swelling.   NEUROLOGIC: Denies syncope or weakness.   PSYCHIATRIC: Denies depression, anxiety or mood swings.    Physical Exam:    APPEARANCE: Well nourished, well developed, in no " acute distress.  AFFECT: WNL, alert and oriented x 3  SKIN: No acne or hirsutism  EENT: No abnormalities   NECK: Neck symmetric without masses or thyromegaly  NODES: No inguinal, cervical, axillary, or femoral lymph node enlargement  CARDIOVASCULAR: Regular rate and rhythm  CHEST: Good respiratory effect  ABDOMEN: Soft.  No tenderness or masses.  No hepatosplenomegaly.  No hernias.  BREASTS: Symmetrical, no skin changes or visible lesions.  No palpable masses, nipple discharge bilaterally.  PELVIC: Normal external genitalia without lesions.  Normal hair distribution.  Adequate perineal body, normal urethral meatus.  Vagina moist and well rugated without lesions or discharge.  Cervix pink, without lesions, discharge or tenderness.  No significant cystocele or rectocele.  Bimanual exam shows uterus to be approximately 18 week size, irregular, mobile and nontender.  Adnexa without masses or tenderness.    EXTREMITIES: No edema.  MUSCULOSKELETAL: Normal strength in upper and lower extremities bilaterally.    NEUROLOGIC: CN II-XII grossly intact.      ASSESSMENT AND PLAN    1. Research study patient  Liquid-based pap smear, screening    Tissue Specimen To Pathology, Obstetrics/Gynecology    Endometrial biopsy       Patient was counseled today on the risks/benefits/alternatives to Phase III Clinical trial.  Will arrange for other study investigations.

## 2019-07-05 ENCOUNTER — RESEARCH ENCOUNTER (OUTPATIENT)
Dept: RESEARCH | Facility: OTHER | Age: 34
End: 2019-07-05
Payer: COMMERCIAL

## 2019-07-05 VITALS
SYSTOLIC BLOOD PRESSURE: 113 MMHG | TEMPERATURE: 98 F | RESPIRATION RATE: 16 BRPM | HEART RATE: 78 BPM | DIASTOLIC BLOOD PRESSURE: 74 MMHG | WEIGHT: 199.5 LBS | BODY MASS INDEX: 34.25 KG/M2

## 2019-07-05 NOTE — PROGRESS NOTES
Date:  05 Jul 2019 @ 7:30am  Study:  Phase 3b Study to Evaluate the Long-Term Safety and Efficacy of Elagolix in Combination with Estradiol/Norethindrone Acetate for the Management of Heavy Menstrual Bleeding Associated with Uterine Fibroids in Premenopausal Women.           Sponsor:  [employer]   IRB/Protocol #: 2017.414 (Approved 01/22/2019)  : Lisa Stewart MD  Sub-Investigators:  Magnus Roa MD and Ant Briscoe MD  Site Number: 767   Visit:  Screening  PCV #1           Assessment/Procedure for Screening PCV #1 Visit on 05 Jul 2019 @ 7:30am  Exam Room #10     Informed Consent Documentation:  Before any activities or procedures were conducted, BUSTER Mccarthy met with Alden Hanna on 13-JUN-2019 @ 2:00pm in the Clinical Trials Unit (CTU), Exam Room #10 to consent Subject for [employer]   (IRB #: 2017.414 approved 01/22/2019) a Phase 3b Study to Evaluate the Long-Term Safety and Efficacy of Elagolix in Combination with Estradiol/Norethindrone Acetate for the Management of Heavy Menstrual Bleeding Associated with Uterine Fibroids in Premenopausal Women. BUSTER Mccarthy allowed ample time for subject to review consent and ask questions before signing consent.  The Subject verbally agreed to continue participating in the study and signed Informed Consent.    A copy of signed consent was given to Subject and uploaded into Epic.       Screening PCV #1 Vital Signs:  Time vital signs taken:  7:56am             Systolic blood pressure:  mm Hg 113              Diastolic blood pressure:  mm Hg 74              Pulse:  78 beats/min              Respiratory Rate: 16 (breaths/min)             Temperature:  97.8 F     Screening PCV - Sanitary Product Collection:  Screening Product Collection Cycle # 1.  Venous blood sample was drawn at 8:10am.  Sanitary products and venous blood sample was obtained and shipped to [facility].  [employer] Tracking #:  [id number], Confirmation #: [id number]. Please note that [employer]   could not get into CTU to  package.  The entrance door to clinic was locked due to OBGY/ONCOGOL[facility] clinic being closed the day after the 4th of July.  The Venous blood sampled obtained was not shipped the same day sanitary products were returned.  On 08-Jul-2019, BUSTER Mccarthy contacted [facility] to schedule an express pickup.  Pickup scheduled and confirmation # is [id number].  Package was picked up by [employer] deliverer at 4:00pm.    Subject was dispensed: 2-pack of Super Kotex Tampons and 1-pack of Super Plus Kotex Tampons.     Screening PCV Urine Pregnancy Test: Pregnancy test conducted at 8:18am; Result- Negative.  hcg Cassette Rapid Test Lot Number:   [id number]   Expiration Date: 2020-11-30     Screening PCV Contraception Counseling/Dispensing:  Contraception counseling performed.  Subject reports that she is not sexually active.  She practices abstinence.  Subject reminded that if she becomes sexually active that a dual contraception method must be used.     Screening PCV Concomitant Medications/Adverse Events:  Concomitant medications were assessed.  Subject did not report any new/changes to medications or over-the-counter medications since last visit to CTU.  Adverse Events:   AEs were assessed and the Subject did not report having or experienced any symptoms since last study visit.       Subject reminded of having to repeat her Endometrial Biopsy with medication being administered first.  BUSTER Mccarthy will contact Subject to schedule Endometrial Biopsy; as well as SIS, DXA and EKG.      Subject visit completed. Denies any questions or concerns at this time.       $50 for PCV #1.  $50 stipend will be placed on subject's ClinCard.

## 2019-07-25 ENCOUNTER — RESEARCH ENCOUNTER (OUTPATIENT)
Dept: RESEARCH | Facility: OTHER | Age: 34
End: 2019-07-25
Payer: COMMERCIAL

## 2019-07-25 VITALS
RESPIRATION RATE: 17 BRPM | BODY MASS INDEX: 34.7 KG/M2 | TEMPERATURE: 98 F | SYSTOLIC BLOOD PRESSURE: 125 MMHG | WEIGHT: 202.19 LBS | DIASTOLIC BLOOD PRESSURE: 81 MMHG | HEART RATE: 90 BPM

## 2019-07-25 NOTE — PROGRESS NOTES
Date:  25 Jul 2019 @ 12:00am  Study:  Phase 3b Study to Evaluate the Long-Term Safety and Efficacy of Elagolix in Combination with Estradiol/Norethindrone Acetate for the Management of Heavy Menstrual Bleeding Associated with Uterine Fibroids in Premenopausal Women.           Sponsor:  [employer]   IRB/Protocol #: 2017.414 (Approved 01/22/2019)  : Lisa Stewart MD  Sub-Investigators:  Magnus Roa MD and Ant Briscoe MD  Site Number: 767   Visit:  Screening  PCV #2           Assessment/Procedure for Screening PCV #2 Visit on 25 Jul 2019 @ 12:00am  Exam Room #10     Informed Consent Documentation:  Before any activities or procedures were conducted, BUSTER Mccarthy met with Alden Hanna on 25-JUL-2019 @ 12:00am in the Clinical Trials Unit (CTU), Exam Room #10 to consent Subject for [employer]   (IRB #: 2017.414 approved 01/22/2019) a Phase 3b Study to Evaluate the Long-Term Safety and Efficacy of Elagolix in Combination with Estradiol/Norethindrone Acetate for the Management of Heavy Menstrual Bleeding Associated with Uterine Fibroids in Premenopausal Women. BUSTER Mccarthy allowed ample time for subject to review consent and ask questions before signing consent.  The Subject verbally agreed to continue participating in the study and signed Informed Consent.    A copy of signed consent was given to Subject and uploaded into Epic.        Screening PCV #2 Vital Signs:  Time vital signs taken:  12:17pm             Systolic blood pressure:  mm Hg 125              Diastolic blood pressure:  mm Hg 81              Pulse:  78 beats/min              Respiratory Rate: 16 (breaths/min)             Temperature:  97.8 F     Screening PCV - Sanitary Product Collection:  Screening Product Collection Cycle # 2.  Venous blood sample was drawn at 12:45pm.  Sanitary products and venous blood sample was obtained and shipped to [facility].  [employer] Tracking #:  [id number], Confirmation #: [id number].      Subject was  dispensed: 1-pack of Regular Kotex Tampons; 1-pack of Super Kotex Tampons and 1-pack of Super Plus Kotex Tampons.  Also, 1-pack of Stayfree overnight and 1-pack of Kotex overnight pads.     Screening PCV Urine Pregnancy Test: Pregnancy test conducted at 12:09pm; Result- Negative.  hcg Cassette Rapid Test Lot Number:   [id number]   Expiration Date: 2020-11-30     Screening PCV Contraception Counseling/Dispensing:  Contraception counseling performed.  Subject reports that she is not sexually active.  She practices abstinence.  Subject reminded that if she becomes sexually active that a dual contraception method must be used.     Screening PCV Concomitant Medications/Adverse Events:  Concomitant medications were assessed.  Subject did not report any new/changes to medications or over-the-counter medications since last visit to CTU.  Adverse Events:   AEs were assessed and the Subject did not report having or experienced any symptoms since last study visit.       Subject reminded of having to repeat her Endometrial Biopsy with medication being administered first.  BUSTER Mccarthy will contact Subject to reschedule Endometrial Biopsy and schedule or SIS and DXA.  The EKG will be conducted by BUSTER Mccarthy during her next scheduled study visit.     Subject visit completed. Denies any questions or concerns at this time.       $50 for PCV #2.  $50 stipend will automatically be placed on subject's ClinCard once data is entered into [employer].

## 2019-08-22 DIAGNOSIS — Z00.6 RESEARCH EXAM: Primary | ICD-10-CM

## 2019-09-25 ENCOUNTER — HOSPITAL ENCOUNTER (OUTPATIENT)
Dept: RADIOLOGY | Facility: OTHER | Age: 34
Discharge: HOME | End: 2019-09-25
Attending: OBSTETRICS & GYNECOLOGY
Payer: COMMERCIAL

## 2019-09-25 DIAGNOSIS — Z00.6 RESEARCH EXAM: ICD-10-CM

## 2019-09-25 PROCEDURE — 77080 DXA BONE DENSITY AXIAL: CPT | Mod: TC

## 2019-09-25 PROCEDURE — 77080 DXA BONE DENSITY AXIAL: CPT | Mod: 26,,, | Performed by: RADIOLOGY

## 2019-09-25 PROCEDURE — 77080 DEXA BONE DENSITY SPINE HIP: ICD-10-PCS | Mod: 26,,, | Performed by: RADIOLOGY

## 2019-10-01 ENCOUNTER — RESEARCH ENCOUNTER (OUTPATIENT)
Dept: RESEARCH | Facility: OTHER | Age: 34
End: 2019-10-01

## 2019-10-01 NOTE — PROGRESS NOTES
Date:  25 Sep 2019 @ 4:30pm  Study:  Phase 3b Study to Evaluate the Long-Term Safety and Efficacy of Elagolix in Combination with Estradiol/Norethindrone Acetate for the Management of Heavy Menstrual Bleeding Associated with Uterine Fibroids in Premenopausal Women.           Sponsor:  [employer]   IRB/Protocol #: 2017.414 (Approved 01/22/2019)  : Lisa Stewart MD  Sub-Investigators:  Magnus Roa MD and Ant Briscoe MD  Site Number: 767   Visit:  DXA  Screening     Informed Consent Documentation:  Before any activities or procedures were conducted, BUSTER Mccarthy met with Alden Hanna on 25-JUL-2019 @ 12:00am in the Clinical Trials Unit (CTU), Exam Room #10 to consent Subject for [employer]   (IRB #: 2017.414 approved 01/22/2019) a Phase 3b Study to Evaluate the Long-Term Safety and Efficacy of Elagolix in Combination with Estradiol/Norethindrone Acetate for the Management of Heavy Menstrual Bleeding Associated with Uterine Fibroids in Premenopausal Women. BUSTER Mccarthy allowed ample time for subject to review consent and ask questions before signing consent.  The Subject verbally agreed to continue participating in the study and signed Informed Consent.    A copy of signed consent was given to Subject and uploaded into Epic.    Assessment Procedure:  DXA scheduled on 25 Sep 2019 in Imaging Department.    Subject appeared on 25 Sep 2019 @ 4:30pm in Imaging Department at [facility] to have her required Screening DXA performed per [employer]  protocol.  DXA was performed by Kassidy Mahoney.  DXA was completed and images were saved to disc (per protocol) and delivered to BUSTER Mccarthy by Kassidy Mahoney.    On 26 Sep 2019, BUSTER Mccarthy shipped disc to [facility] as per protocol via [employer].  UPS tracking #: [id number].     Completed procedure was noted in EDC and OnCore.  Subject's stipend of $50 will automatically be generated in ClinCard was procedure is captured in  Any.

## 2019-10-14 DIAGNOSIS — N88.2 CERVICAL OS STENOSIS: Primary | ICD-10-CM

## 2019-10-14 RX ORDER — MISOPROSTOL 200 UG/1
200 TABLET ORAL
Qty: 1 TABLET | Refills: 0 | Status: SHIPPED | OUTPATIENT
Start: 2019-10-14 | End: 2022-03-03

## 2019-10-15 ENCOUNTER — DOCUMENTATION ONLY (OUTPATIENT)
Dept: RESEARCH | Facility: OTHER | Age: 34
End: 2019-10-15

## 2019-10-16 NOTE — PROGRESS NOTES
On 14Oct2019, BUSTER Mccarthy spoke with Nadia Owusu, Retail Pharmacist in the Outpatient Pharmacy to discuss purchasing 1 table of misoprostol/Cytotec 200 mcg at $2.99 to have her Screening repeat Endometrial Biopsy and SIS procedures performed on 17Oct2019 @ 4:30pm with Dr. Lisa Stewart and Mai Gramajo, Lead Sonographer in [address].      On 15Oct2019 at 5:03pm, [employer]  Subject # [id number] appeared in the [facility] with BUSTER Mccarthy to  her prescribed medication.  BUSTER Mccarthy paid for prescription. BUSTER Mccarthy reminded the study patient that she is to take the tablet at least 30 minutes before her scheduled procedures at 4:30pm.

## 2019-10-17 ENCOUNTER — RESEARCH ENCOUNTER (OUTPATIENT)
Dept: RESEARCH | Facility: OTHER | Age: 34
End: 2019-10-17
Payer: COMMERCIAL

## 2019-10-17 NOTE — PROGRESS NOTES
Date:  17 Oct 2019 @ 4:30pm  Study:  Phase 3b Study to Evaluate the Long-Term Safety and Efficacy of Elagolix in Combination with Estradiol/Norethindrone Acetate for the Management of Heavy Menstrual Bleeding Associated with Uterine Fibroids in Premenopausal Women.           Sponsor:  Kalin [id number]  IRB/Protocol #: 2017.414 (Approved 01/22/2019)  : Lisa Stewart MD  Sub-Investigators:  Magnus Roa MD and Ant Briscoe MD  Site Number: [id number]   Visit:  Screening  Repeat Endometrial Biopsy and SIS     Informed Consent Documentation:  Before any activities or procedures were conducted, BUSTER Mccarthy met with Alden CURTISRonak Jacques on 25-JUL-2019 @ 12:00am in the Clinical Trials Unit (CTU), Exam Room #10 to consent Subject for Kalin [id number]  (IRB #: 2017.414 approved 01/22/2019) a Phase 3b Study to Evaluate the Long-Term Safety and Efficacy of Elagolix in Combination with Estradiol/Norethindrone Acetate for the Management of Heavy Menstrual Bleeding Associated with Uterine Fibroids in Premenopausal Women. BUSTER Mccarthy allowed ample time for subject to review consent and ask questions before signing consent.  The Subject verbally agreed to continue participating in the study and signed Informed Consent.    A copy of signed consent was given to Subject and uploaded into Epic.     Assessment Procedure:  Repeat Endometrial Biopsy and SIS:.     Kalin [id number] Subject [id number] appeared on 17-Oct-2019 @ 4:30pm in [address] to have her repeat Endometrial Biopsy (requested by study team) and SIS Screening procedures performed by Dr. Lisa Stewart and Mai Gramajo, Sonographer.  Endometrial Biopsy sample was collected and placed in Fixative at 16:59pm (4:59pm).  Endometrial Biopsy Sample (Ambient) as per protocol will be shipped via [facility] on 18-Oct-2019; Tracking #: [id number]..  [facility] Mai Gramajo.       Completed procedures were noted in EDC and  Oncore.  Subject's stipend of $100 will be placed on [employer].  $50 for repeat Endometrial Biopsy and $50 for SIS.

## 2019-10-24 ENCOUNTER — DOCUMENTATION ONLY (OUTPATIENT)
Dept: RESEARCH | Facility: OTHER | Age: 34
End: 2019-10-24

## 2019-10-24 NOTE — PROGRESS NOTES
On 24-Oct-2019 @ 12:15pm, Kalin  research participant contacted BUSTER Mccarthy to report adverse events(AEs) she has been having after having her repeat Endometrial Biopsy and SIS performed on 17-Oct-2019.      Repeat Endometrial Biopsy was requested by the study team.  Subject reported that she started experiencing pelvic pain and pain shooting down her legs to her ankles the next day on 18-Oct-2019.  She started taking Advil 200mg as needed for the pain on 20-Oct-2019.  She noted that she also experienced increased pain during her cycle.  Subject requested if Dr. Stewart, PI for research study could prescribe her a stronger medication for pain.    BUSTER Mccarthy documented AEs and concomitant medication (Advil) in Medidata.  Since adverse events were reported Subject will need to have a physical examination and AEs assessed by Dr. Stewart.    BUSTER Mccarthy emailed Dr. Stewart who stated that she could see Subject on 07-Nov-2019 @ 4:00pm for a physical examination.  Dr. Stewart requested that BUSTER Mccarthy check with study team to see if she can prescribe Toradol for pain.  BUSTER Mccarthy emailed Kalin Dubon for confirmation from study team.

## 2019-10-25 RX ORDER — KETOROLAC TROMETHAMINE 10 MG/1
10 TABLET, FILM COATED ORAL EVERY 6 HOURS
Qty: 12 TABLET | Refills: 0 | Status: SHIPPED | OUTPATIENT
Start: 2019-10-25 | End: 2019-10-28

## 2019-10-28 ENCOUNTER — RESEARCH ENCOUNTER (OUTPATIENT)
Dept: OBSTETRICS AND GYNECOLOGY | Facility: CLINIC | Age: 34
End: 2019-10-28

## 2019-10-28 NOTE — PROGRESS NOTES
Date:  28 Oct 2019 @ 4:45pm  Study:  Phase 3b Study to Evaluate the Long-Term Safety and Efficacy of Elagolix in Combination with Estradiol/Norethindrone Acetate for the Management of Heavy Menstrual Bleeding Associated with Uterine Fibroids in Premenopausal Women.           Sponsor:  [employer]   IRB/Protocol #: 2017.414 (Approved 01/22/2019)  : Lisa Stewart MD  Sub-Investigators:  Magnus Roa MD and Ant Briscoe MD  Site Number: [id number]   Visit:  Screening  ECG     Informed Consent Documentation:  Before any activities or procedures were conducted, BUSTER Mccarthy met with Alden EVERRonak Hanna on 25-JUL-2019 @ 12:00am in the Clinical Trials Unit (CTU), Exam Room #10 to consent Subject for [employer]   (IRB #: 2017.414 approved 01/22/2019) a Phase 3b Study to Evaluate the Long-Term Safety and Efficacy of Elagolix in Combination with Estradiol/Norethindrone Acetate for the Management of Heavy Menstrual Bleeding Associated with Uterine Fibroids in Premenopausal Women. BUSTER Mccarthy allowed ample time for subject to review consent and ask questions before signing consent.  The Subject verbally agreed to continue participating in the study and signed Informed Consent.    A copy of signed consent was given to Subject and uploaded into Epic.     Assessment Procedure:  Screening/ECG     28-Oct-2019 at 5:00pm, [employer]  Subject [id number] appeared in CTU, [address] to have her Screening ECG performed. At 5:15pm ECG was performed; resulting in Normal ECG.  ECG will be signed by Dr. Lisa Stewart, PI and scanned into Epic.      After further discussing her upcoming Day 1/Randomization visit which needed to be conducted before the 7th of November due to that being day 12 of her cycle; she advised that she miscalculated.  Her cycle will start on 12-Nov-2019, with the possibility of it coming 2 days earlier (10-November-2019).

## 2019-10-30 VITALS — WEIGHT: 210 LBS | BODY MASS INDEX: 36.05 KG/M2

## 2019-11-05 DIAGNOSIS — Z00.6 RESEARCH STUDY PATIENT: Primary | ICD-10-CM

## 2019-11-06 ENCOUNTER — RESEARCH ENCOUNTER (OUTPATIENT)
Dept: RESEARCH | Facility: OTHER | Age: 34
End: 2019-11-06

## 2019-11-06 ENCOUNTER — PROCEDURE VISIT (OUTPATIENT)
Dept: OBSTETRICS AND GYNECOLOGY | Facility: CLINIC | Age: 34
End: 2019-11-06
Attending: OBSTETRICS & GYNECOLOGY
Payer: COMMERCIAL

## 2019-11-06 DIAGNOSIS — Z00.6 RESEARCH STUDY PATIENT: ICD-10-CM

## 2019-11-07 ENCOUNTER — OFFICE VISIT (OUTPATIENT)
Dept: OBSTETRICS AND GYNECOLOGY | Facility: CLINIC | Age: 34
End: 2019-11-07
Attending: OBSTETRICS & GYNECOLOGY
Payer: COMMERCIAL

## 2019-11-07 VITALS
HEIGHT: 64 IN | BODY MASS INDEX: 35.76 KG/M2 | SYSTOLIC BLOOD PRESSURE: 116 MMHG | WEIGHT: 209.44 LBS | DIASTOLIC BLOOD PRESSURE: 80 MMHG

## 2019-11-07 DIAGNOSIS — Z00.6 RESEARCH STUDY PATIENT: Primary | ICD-10-CM

## 2019-11-07 PROCEDURE — 99499 NO LOS: ICD-10-PCS | Mod: S$GLB,,, | Performed by: OBSTETRICS & GYNECOLOGY

## 2019-11-07 PROCEDURE — 99499 UNLISTED E&M SERVICE: CPT | Mod: S$GLB,,, | Performed by: OBSTETRICS & GYNECOLOGY

## 2019-11-07 PROCEDURE — 99999 PR PBB SHADOW E&M-EST. PATIENT-LVL II: ICD-10-PCS | Mod: PBBFAC,,, | Performed by: OBSTETRICS & GYNECOLOGY

## 2019-11-07 PROCEDURE — 99999 PR PBB SHADOW E&M-EST. PATIENT-LVL II: CPT | Mod: PBBFAC,,, | Performed by: OBSTETRICS & GYNECOLOGY

## 2019-11-07 NOTE — PROGRESS NOTES
Date:  06-NOV- 2019 @ 4:30pm  Study:  Phase 3b Study to Evaluate the Long-Term Safety and Efficacy of Elagolix in Combination with Estradiol/Norethindrone Acetate for the Management of Heavy Menstrual Bleeding Associated with Uterine Fibroids in Premenopausal Women.           Sponsor:  [employer]   IRB/Protocol #: 2017.414 (Approved 01/22/2019)  : Lisa Stewart MD  Sub-Investigators:  Magnus Roa MD and Ant Briscoe MD  Site Number: [id number]   Visit:  Screening  Repeat Pelvic Ultrasound / TAU, TVU     Informed Consent Documentation:  Before any activities or procedures were conducted, BUSTER Mccarthy met with Alden CURTISRonak Grandy on 25-JUL-2019 @ 12:00am in the Clinical Trials Unit (CTU), Exam Room #10 to consent Subject for [employer]   (IRB #: 2017.414 approved 01/22/2019) a Phase 3b Study to Evaluate the Long-Term Safety and Efficacy of Elagolix in Combination with Estradiol/Norethindrone Acetate for the Management of Heavy Menstrual Bleeding Associated with Uterine Fibroids in Premenopausal Women. BUSTER Mccarthy allowed ample time for subject to review consent and ask questions before signing consent.  The Subject verbally agreed to continue participating in the study and signed Informed Consent.    A copy of signed consent was given to Subject and uploaded into Epic.     Assessment Procedure:  Screening Repeat Pelvic Ultrasound / TAU, TVU     Subject appeared on 06-NOV-2019 @ 4:30pm in [address] to have her repeat Pelvic Ultrasound / TAU, TVU per [employer]  protocol since she has been in Screening >4 months.  Pelvic Ultrasound / TAU, TVU was performed by Sonographer, Mai Gramajo in [address].  Images were uploaded and sumbitted to [employer] per protocol procedures.     Completed procedure was noted in EDC and OnCore.  Subject's stipend of $50 will be manually (repeat procedure) placed in ClinCard was procedure is captured in Medidata.       Subject was reminded of her scheduled appointment  on 07-NOV-2019 @ 4:00pm with Dr. Stewart to have AEs addressed that were reported on 17-OCT-2019.  Subject was asked if she had any questions or concerns.  Subject responded no; that she understood.

## 2019-11-07 NOTE — PROGRESS NOTES
"Chief complaint: Cramping    Alden Hanna is a 34 y.o. [id number] presents with complaint of cramping after SIS.      She reports pain was severe. It became better over the next several days after SIS.  She had some irregular bleeding during that time.  She is starting to have some cramping.  She is expecting her next menstrual period in the next couple of days.    /80   Ht 5' 4" (1.626 m)   Wt 95 kg (209 lb 7 oz)   LMP 10/18/2019 (Exact Date)   BMI 35.95 kg/m²     ROS:  GENERAL: No fever, chills, fatigability or weight loss.  VULVAR: No pain, no lesions and no itching.  VAGINAL: No relaxation, no itching, no discharge, no abnormal bleeding and no lesions.  ABDOMEN: No abdominal pain. Denies nausea. Denies vomiting. No diarrhea. No constipation  BREAST: Denies pain. No lumps. No discharge.  URINARY: No incontinence, no nocturia, no frequency and no dysuria.  CARDIOVASCULAR: No chest pain. No shortness of breath. No leg cramps.  NEUROLOGICAL: No headaches. No vision changes.    Physical exam:      PELVIC: Normal external genitalia without lesions.  Normal hair distribution.  Adequate perineal body, normal urethral meatus.  Vagina moist and well rugated without lesions or discharge.  Cervix pink, without lesions, discharge or tenderness.  No significant cystocele or rectocele.  Bimanual exam shows uterus to be enlarged, approximately 16 week size, irregular, mobile and nontender.  Adnexa without masses or tenderness.        1. Research study patient         Continue management as per research protocol  "

## 2019-11-20 ENCOUNTER — RESEARCH ENCOUNTER (OUTPATIENT)
Dept: RESEARCH | Facility: OTHER | Age: 34
End: 2019-11-20

## 2019-11-20 VITALS
WEIGHT: 206.56 LBS | DIASTOLIC BLOOD PRESSURE: 82 MMHG | HEART RATE: 82 BPM | SYSTOLIC BLOOD PRESSURE: 122 MMHG | RESPIRATION RATE: 18 BRPM | BODY MASS INDEX: 35.46 KG/M2 | TEMPERATURE: 98 F

## 2019-11-21 NOTE — PROGRESS NOTES
Inform Consent Form ICF)  Re-Consent Documentation: Before any activities or procedures were conducted, BUSTER Mccarthy met with Alden Hanna on 20-NOV-2019 @ 4:30pm to re-consent Subject.  BUSTER Mccarthy reviewed amended consent for [employer] Study  (IRB #: 2017.414 approved 12-Nov-2019) with Subject.  Ample time was given for Subject to review, ask further questions before signing consent.  The Subject verbally agreed to continue her participation in the the study and signed Informed Consent (IRB #: 2017.414 approved 11/12/2019).  A copy of signed consent was given to Subject and uploaded into Epic.      Date:  20-Nov- 2019 @ 4:30pm  Study: Phase 3b Study to Evaluate the Long-Term Safety and Efficacy of Elagolix in Combination with Estradiol/Norethindrone Acetate for the Management of Heavy Menstrual Bleeding Associated with Uterine Fibroids in Premenopausal Women.           Sponsor:  [employer]   IRB/Protocol #: 2017.414 (Approved 01/22/2019)  : Lisa Stewart MD  Sub-Investigators:  Magnus Roa MD and Ant Briscoe MD  Site Number: [id number]   Visit:  Day 1 Treatment Study Visit     Assessment/Procedures for Year 1 Treatment Period - Day 1 Visit on 20-Nov- 2019 @ 4:30pm  Day 7 of full menstrual flow (started 14-Nov- 2019  Exam Room #11  Completed PRE-DOSE:     Review/Update Non-Gynecological Medical/Surgical History:  Review/Update of Non-Gynecological Medical/Surgery History was conducted by BUSTER Mccarthy.  No new/updated information reported.     Review/Update Gynecological Medical/Surgical History:  Review/Update of Gynecological Medical/Surgical History was conducted by BUSTER Mccarthy.  No new/updated information reported.     Review/Update Menstrual and Obstetrical History:  Review/Update Menstrual and Obstetrical History was conducted by BUSTER Mccarthy.  No new/updated information reported.     Symptom Directed Physical Examination:  Subject did not report any symptoms/adverse events since last  study visit.  Therefore, symptom directed physical examination is not required by Dr. Stewart.     Day 1 Treatment Vital Signs:  Time vital signs taken:  4:38pm             Systolic blood pressure:  mm Hg 122              Diastolic blood pressure:  mm Hg 82              Pulse:  82 beats/min              Respiratory Rate: 18 (breaths/min)             Temperature:  97.7 F     Biomarker Blood Sample - Serum Estradiol (E2):  Biomarker Blood Sample - Serum Estradiol (E2) sample was drawn at 4:43pm prior to 1st dose of study drug.  Due to late timing of sample collection, samples were processed per protocol.  Ambient samples were stored in a safeguarded place and frozen samples placed in the freezer.  On 21-Nov-2019, per protocol samples (ambient and frozen)were packaged, shipped and picked up via [employer] Tracking #: [id number]     Administer ePRO/Remind subject to complete e-Diary:  BUSTER Mccarthy handed [employer]Max Slate to subject to complete questionnaires.  BUSTER Mccarthy completed clinicians questionnaires on [employer] Slate.  BUSTER Mccarthy reminded subject to complete her daily e-Diary entries.     Assessments/Procedures to be completed any time during the visit:     Pelvic Ultrasound:  TAU, TVU (if subject has been in screening >4months, needs to be repeated):  Subject was in screening >4 months; therefore subject had Pelvic Ultrasound repeated on 06-NOV-2019.       Dispense Sanitary Products and Collection Kit (Keg, Collection Bags, etc.):  Informed Consent was amended and IRB approved on 12 Nov 2019 to amend that PCV during treatment period are no longer collected. Therefore, the collection kit was not dispensed to the subject.  Subject received 3-packs of Stay Free Overnight sanitary products.     Clinical Laboratory/Safety Labs Tests:  Chemistry, Hematology, Lipid Panel, Vitamin D Testing and Pharmacogenetic (PG) samples were collected at 4:43pm by Lisa Fairchild Research Nurse..   Due to late time of  collections, samples were processed, but not shipped out on 20-NOV-2019 day of collection. Ambient samples were placed in safeguarded space and frozen samples were placed in freezer.   On 21-Nov-2019, per protocol samples (ambient and frozen)were packaged, shipped and picked up via [facility] Tracking #: [id number]    Pharmacogenetic DNA and RNA (Optional Based on Subject Consent):  Subject consented not to participate in the Pharmacogenetic DNA and RNA.       Urine Pregnancy Test (Also dispense home pregnancy test kits for subjects to self-administer at Month 2):  SAS Pregnancy Urine Test was administered at 4:46pm.  Negative result.  Two pregnancy tests were given to subject to administer during her Month 2 Telephone Visit.     Serum Pregnancy Test:  N/A     Contraception Counseling/Dispense Contraceptives as Necessary:  Contraception counseling was performed by BUSTER Mccarthy.  There has not been a change/new method since last visit.  Subject is not required to use dual contraception methods since she practices total abstinence from sexual intercourse, as the preferred lifestyle of the subject.  Subject was reminded that periodic abstinence is not acceptable.     Subject to Complete Birth Control Attestation:  Since subject is practicing total abstinence she is not required to sign attestation, unless her sexual activity status changes during her participation in this study.     Adverse Event Monitoring:  Subject did not report any AE's or symptoms during this study visit.     Review/Update Concomitant Medications:  BUSTER Mccarthy reviewed/updated concomitant mediations with subject.  None were reported.     Study Drug Administration:     [place] Randomization Visit in IRT:  BUSTER Mccarthy registered subject in [facility] for randomization.  Confirmation notice was received from [facility].     Study Drug Administration - 1st dose:  Discussion of how to take study drug with subject was conducted and BUSTER Mccarthy observed  subject take first dose of study drug at 5:19pm.     Day 1 Treatment Study Visit was completed.  Subject denies any questions or concerns at this time.       BUSTER Mccarthy will place $100 on subject's ClinCard for Day 1 Treatment Study Visit.    BUSTER Mccarthy asked Subject if she had any questions or concerns.  Subject reported that she did not have any questions or concerns.  BUSTER Mccarthy reminded Subject to take her medications as prescribed daily; and to enter data daily into eDiary.  Also, BUSTER Mccarthy reminded Subject that her next scheduled phone visit will be Month 2 (January 2020)  and that she will return to CTU for her Month 3 visit on 12-Feb-2020 -4/+4 days.

## 2020-01-31 ENCOUNTER — TELEPHONE (OUTPATIENT)
Dept: RESEARCH | Facility: OTHER | Age: 35
End: 2020-01-31

## 2020-01-31 NOTE — TELEPHONE ENCOUNTER
Date:  31 JAN 2020 @ 5:45pm  Study: Phase 3b Study to Evaluate the Long-Term Safety and Efficacy of Elagolix in Combination with Estradiol/Norethindrone Acetate for the Management of Heavy Menstrual Bleeding Associated with Uterine Fibroids in Premenopausal Women.           Sponsor:  [employer]   IRB/Protocol #: 2017.414 (Approved 01/22/2019)  : Lisa Stewart MD  Sub-Investigators:  Magnus Roa MD and Ant Briscoe MD  Site Number: [id number]   Visit:  Treatment Month 2 Telephone Visit      is a Phase 3b Study to Evaluate the Long-Term Safety and Efficacy of Elagolix in Combination with Estradiol/Norethindrone Acetate for the Management of Heavy Menstrual Bleeding Associated with Uterine Fibroids in Premenopausal Women.        Inform Consent Form ICF)  Re-Consent Documentation: Before any activities or procedures were conducted, BUSTER Mccarthy met with Alden Hanna on 20-NOV-2019 @ 4:30pm to re-consent Subject.  BUSTER Mccarthy reviewed amended consent for [employer] Study  (IRB #: 2017.414 approved 12-Nov-2019) with Subject.  Ample time was given for Subject to review, ask further questions before signing consent.  The Subject verbally agreed to continue her participation in the the study and signed Informed Consent (IRB #: 2017.414 approved 11/12/2019).  A copy of signed consent was given to Subject and uploaded into Epic.       Assessment for  Treatment Month 2 Telephone Contact Summary:    BUSTER Mccarthy contacted Subject to conduct Treatment Month 2 Phone Visit.     Remind Subject to start collecting sanitary products:  Per protocol Amendment 3 approved on 12 NOV 2019, Subject is no longer required to collect sanitary products..       Treatment Month 2 Subject Self-Administered Home Urine Pregnancy Test:  Pregnancy test performed by Subject at home @ 2:25pm    Result: Negative.    HCG Cassette Rapid Test    Lot [id number]  Expiration: 2020-02-29     Treatment Month 2 Contraception  Counseling/Dispensing:    Contraception counseling was performed by BUSTER Mccarthy.  There has not been a change/new method since last visit.  Subject was reminded that she can not become pregnant while participating in the study. Subject expressed understanding.     Treatment Month 2 Concomitant Medications/Adverse Event Monitoring:   Concomitant medications were assessed.  Subject reported taking over-the-counter Claritin (24 Hour) for allergies. Start date: 30-Jan-2020.  Concomitant Medications Log updated to reflect.  Subject did not report any adverse events.       Subject reported that she is no longer able to enter data into her e-Diary entries.  Subject was informed that with Protocol Amendment 3 e-Diary data collection is no longer required.       Subject's stipend of $25 for Treatment Month 2 Phone Visit will be automatically generated on her ClinCard.       Subject's Treatment Month 2 Phone Visit will be documented in Rave and OnCore.  Subject was reminded that her Treatment Month 3 is scheduled for 14-Feb-2020 @ 3:30pm.       Subject visit completed. She denies any questions or concerns at this time.  Phone Contact Visit ended at 6:05pm.

## 2020-02-12 DIAGNOSIS — Z00.6 RESEARCH STUDY PATIENT: Primary | ICD-10-CM

## 2020-02-13 ENCOUNTER — RESEARCH ENCOUNTER (OUTPATIENT)
Dept: RESEARCH | Facility: OTHER | Age: 35
End: 2020-02-13
Payer: COMMERCIAL

## 2020-02-13 VITALS
WEIGHT: 211.56 LBS | DIASTOLIC BLOOD PRESSURE: 85 MMHG | BODY MASS INDEX: 36.31 KG/M2 | SYSTOLIC BLOOD PRESSURE: 140 MMHG | TEMPERATURE: 98 F | HEART RATE: 80 BPM | RESPIRATION RATE: 18 BRPM

## 2020-02-13 DIAGNOSIS — Z00.6 RESEARCH STUDY PATIENT: ICD-10-CM

## 2020-02-13 NOTE — TELEPHONE ENCOUNTER
Called pharmacy, regarding rx. Pharmacy cancelled rx will call [facility] to fill rx       ----- Message from Boone No sent at 2/13/2020  9:10 AM CST -----  Contact: [facility] on behalf of this Pt   [facility] ([facility]) called in about a prescription Electronic RX cant fill needs to speak w/ staff [phone] (sondra)

## 2020-02-14 NOTE — PROGRESS NOTES
Inform Consent Form ICF)  Re-Consent Documentation: Before any activities or procedures were conducted, BUSTER Mccarthy met with Alden Hanna on 20-NOV-2019 @ 4:30pm to re-consent Subject.  BUSTER Mccarthy reviewed amended consent for [employer] Study  (IRB #: 2017.414 approved 12-Nov-2019) with Subject.  Ample time was given for Subject to review, ask further questions before signing consent.  The Subject verbally agreed to continue her participation in the the study and signed Informed Consent (IRB #: 2017.414 approved 11/12/2019).  A copy of signed consent was given to Subject and uploaded into Epic.      Assessment/Procedure for Treatment Month 3 Study Visit on 13 FEB 2020 @ 4:45pm  Exam Room: Infusion     Month 3 Treatment Vital Signs:  Time vital signs taken: 5:00pm             Systolic blood pressure:  mm Hg 140 (Lisa Fairchild RN assessed that patient showed no signs of hypertensive crisis.)              Diastolic blood pressure:  mm Hg 85              Pulse:  80 beats/min              Respiratory Rate: 18 (breaths/min)             Temperature:  97.9 F              Weight:  95.95kg     Dispense Sanitary Products and Collection Kit (Keg, Collection Bags, etc.):  Per protocol Amendment 3 approved on 12 NOV 2019, Subject is no longer required to collect sanitary products.      Return Sanitary Products; draw venous blood sample:   Informed Consent was amended and IRB approved on 12 Nov 2019 to amend that PCV during treatment period are no longer collected.   Therefore, venous blood sample was not collected.       Clinical Laboratory/Safety Labs Tests:  Hematology & Differential Planel, Serum Beta hCG, ELAG/COLLINS PK Plasma and E2 PD were collected at 5:10pm by Lisa Fairchild, Research Nurse.  Due to late time of collections, samples were processed, but not shipped out on 13-FEB-2020 day of collection. Ambient samples were placed in safeguarded space and frozen samples were placed in freezer.   On 14-FEB-2020, per  protocol, samples (frozen and ambient) were shipped and picked up via [facility] Tracking #: [id number]  Confirmation #: [id number]..      Urine Pregnancy Test (Also dispense home pregnancy test kits for subjects to self-administer at Month 4):  [facility] Pregnancy Urine test, Lot #: [id number], Expiration September 2021, was administered at 5:00pmpm.  Negative result. Pregnancy test kits were given to subject to self-administer at home for Months 4 and 5.       Contraception Counseling/Dispense Contraceptives as Necessary:  Contraception counseling was performed by BUSTER Mccarthy.  There has not been a change/new method since last visit.  Subject is not required to use dual contraception methods since she practices total abstinence from sexual intercourse, as the preferred lifestyle of the subject.  Subject was reminded that periodic abstinence is not acceptable.     Subject to Complete Birth Control Attestation:  Since subject is practicing total abstinence she is not required to sign attestation, unless her sexual activity status changes during her participation in this study.     Administer ePRO/Remind subject to complete e-Diary:  BUSTER Mccarthy completed all forms that needed to be completed by Clinician.  BUSTER Mccarthy handed subject the ePRO to complete all required questionnaires. Subject was reminded to complete her daily e-Diary entries.     [place] in IRT: Month 3 visit in [facility].       Study Drug Administration / Accountability:  Subject did not return used/unused kits previously dispensed on 20-NOV-2020 during Day 1/Randomization.  Subject was dispensed the following kits below:                   Subject Dispense Kit     Dispensing Date Kit Desc Lot ID Info Kit ID B/D Qty      13-Feb-2020       [id number] D 1      13-Feb-2020       [id number] D 1      13-Feb-2020       [id number] D 1      13-Feb-2020       [id number] D 1      13-Feb-2020       [id number] D 1      13-Feb-2020       [id number] D 1                Study Drug  Accountability:  Subject did not return study drugs that were dispensed on 20-NOV-2019.  Subject will return kits on 14-FEB-2020.        Adverse Event Monitoring:  Subject did not report any new AE's or symptoms during this study visit.       Assessment Review/Update Concomitant Medications:  BUSTER Mccarthy reviewed/updated concomitant mediations with subject.   Subject reported taking Benadryl 25mg which she started on 31-JAN-2020 and ended 31-JAN-2020.  AE Log was updated.     $50 for Treatment Month 3 will be placed on subject's ClinCard automatically by [employer].     BUSTER Mccarthy asked Subject if she had any questions or concerns.  Subject reported that she did not have any questions or concerns.  BUSTRE Mccarthy reminded Subject to take her medications as prescribed daily; and to enter data daily into eDiary.  Also, BUSTER Mccarthy reminded Subject that her next scheduled Months 4 and 5 will be Phone Visits and Month 6 Treatment study visit is expected on 06-May-2020 with window -4/+4 days.  BUSTER Mccarthy will contact subject to schedule appointments.          Adequate: hears normal conversation without difficulty

## 2020-04-09 ENCOUNTER — TELEPHONE (OUTPATIENT)
Dept: RESEARCH | Facility: OTHER | Age: 35
End: 2020-04-09

## 2020-04-09 NOTE — TELEPHONE ENCOUNTER
Date:  09-Apr-2020 @ 3:15pm  Study: Phase 3b Study to Evaluate the Long-Term Safety and Efficacy of Elagolix in Combination with Estradiol/Norethindrone Acetate for the Management of Heavy Menstrual Bleeding Associated with Uterine Fibroids in Premenopausal Women.           Sponsor:  [employer]   IRB/Protocol #: 2017.414 (Approved 01/22/2019)  : Lisa Stewart MD  Sub-Investigators:  Magnus Roa MD and Ant Briscoe MD  Site Number: [id number]   Visit:  Treatment Month 4 Telephone Visit      is a Phase 3b Study to Evaluate the Long-Term Safety and Efficacy of Elagolix in Combination with Estradiol/Norethindrone Acetate for the Management of Heavy Menstrual Bleeding Associated with Uterine Fibroids in Premenopausal Women.        Inform Consent Form ICF)  Re-Consent Documentation: Before any activities or procedures were conducted, BUSTER Mccarthy met with Alden Hanna on 20-NOV-2019 @ 4:30pm to re-consent Subject.  BUSTER Mccarthy reviewed amended consent for [employer] Study  (IRB #: 2017.414 approved 12-Nov-2019) with Subject.  Ample time was given for Subject to review, ask further questions before signing consent.  The Subject verbally agreed to continue her participation in the the study and signed Informed Consent (IRB #: 2017.414 approved 11/12/2019).  A copy of signed consent was given to Subject and uploaded into Epic.       Assessment for  Treatment Month 4 Telephone Contact Summary:    Subject was updated with the latest COVID-19 patient visit from the Sponsor and [facility]. Subject was reminded to continue taking her IP as instructed.  Subject's Month 5 Phone Visit will be conducted over the phone and her Month 6 will be in-person.      BUSTER Mccarthy contacted Subject to conduct Treatment Month 4 Phone Visit.     Remind Subject to start collecting sanitary products:  Per protocol Amendment 3 approved on 12 NOV 2019, Subject is no longer required to collect sanitary  products..       Treatment Month 4 Subject Self-Administered Home Urine Pregnancy Test:  Pregnancy test performed by Subject at home @ 3:00pm    Result: Negative.    HCG Cassette Rapid Test    Lot [id number]  Expiration: 2020-02-29     Treatment Month 4 Contraception Counseling/Dispensing:    Contraception counseling was performed.  There has not been a change/new method since last visit. Subject was reminded that she can not become pregnant while participating in the study. Subject expressed understanding.     Treatment Month 4 Concomitant Medications/Adverse Event Monitoring:   Concomitant medications were assessed.  Subject reported taking over-the-counter Claritin (24 Hour) for allergies. Start date: 30-Jan-2020.  Concomitant Medications Log updated to reflect.  Subject did not report any adverse events.       Subject was informed that with Protocol Amendment 3 e-Diary data collection is no longer required.       Subject's stipend of $25 for Treatment Month 4 Phone Visit will be automatically generated on her ClinCard.       Subject's Treatment Month 4 Phone Visit will be documented in Rave and OnCore.  Subject was reminded that her Treatment Month 5 will be another Telephone Study Visit and Month 6 is -4/+4 06-May-2020.  Subject was reminded that [facility] will call in advance to schedule appointments.    Subject visit completed. She denies any questions or concerns at this time.  Phone Contact Visit ended at 3:45pm.      Subject's stipend of $25 for Year 1 Treatment Month 4 Phone Visit will be automatically generated on her ClinCard.

## 2020-05-13 ENCOUNTER — TELEPHONE (OUTPATIENT)
Dept: RESEARCH | Facility: OTHER | Age: 35
End: 2020-05-13

## 2020-05-13 NOTE — TELEPHONE ENCOUNTER
Date:  13-May-2020 @ 3:15pm  Study: Phase 3b Study to Evaluate the Long-Term Safety and Efficacy of Elagolix in Combination with Estradiol/Norethindrone Acetate for the Management of Heavy Menstrual Bleeding Associated with Uterine Fibroids in Premenopausal Women.           Sponsor:  [employer]   IRB/Protocol #: 2017.414 (Approved 01/22/2019)  : Lisa Stewart MD  Sub-Investigators:  Magnus Roa MD and Ant Briscoe MD  Site Number: [id number]   Visit:  Treatment Month 5 Telephone Visit      is a Phase 3b Study to Evaluate the Long-Term Safety and Efficacy of Elagolix in Combination with Estradiol/Norethindrone Acetate for the Management of Heavy Menstrual Bleeding Associated with Uterine Fibroids in Premenopausal Women.        Inform Consent Form ICF)  Re-Consent Documentation: Before any activities or procedures were conducted, BUSTER Mccarthy met with Alden Hanna on 20-NOV-2019 @ 4:30pm to re-consent Subject.  BUSTER Mccarthy reviewed amended consent for [employer] Study  (IRB #: 2017.414 approved 12-Nov-2019) with Subject.  Ample time was given for Subject to review, ask further questions before signing consent.  The Subject verbally agreed to continue her participation in the the study and signed Informed Consent (IRB #: 2017.414 approved 11/12/2019).  A copy of signed consent was given to Subject and uploaded into Epic.       Assessment for  Treatment Month 5 Telephone Contact Summary:     Subject was updated with the latest COVID-19 patient visit from the Sponsor and [facility]. Subject was reminded to continue taking her IP as instructed.  Subject was instructed to note the date and time of her 4th to last dose, 3rd, 2nd and last of IP.  Subject's Month 6 will be in-person if we resume with face-to-face clinic visits.       BUSTER Mccarthy contacted Subject to conduct Treatment Month 5 Phone Visit.     Remind Subject to start collecting sanitary products:  Per protocol Amendment  3 approved on 12 NOV 2019, Subject is no longer required to collect sanitary products..       Treatment Month 5 Subject Self-Administered Home Urine Pregnancy Test:  Pregnancy test performed by Subject at home @ 9:00amm    Result: Negative.    HCG Cassette Rapid Test    Lot [id number]  Expiration: 2020-11-30     Treatment Month 5 Contraception Counseling/Dispensing:    Contraception counseling was performed.  There has not been a change/new method since last visit. Subject was reminded that she can not become pregnant while participating in the study. Subject expressed understanding.     Treatment Month 5 Concomitant Medications/Adverse Event Monitoring:   Concomitant medications were assessed.  Subject did not take any over the counter or prescribed medications since last phone visit.  She provided a start and end date for Mucinex that she reported on 09-Apr-2020.  Start Date:  10-Feb-2020, End Date:  17-Feb-2020.  Subject took Mucinex for nasal and sinus congestion and cough.   Concomitant Medications Log updated to reflect.      Adverse Event Monitoring:  Subject reported that she has noticed her hair shedding / falling out starting about in Feb 2020.  Adverse Event will be documented on Adverse Event Log and given to Dr. Stewart for review.  Subject was informed that Dr. Stewart will address the Adverse Event when she reports for her clinic study visit.         Subject was informed that with Protocol Amendment 3 e-Diary data collection is no longer required.       Subject's stipend of $25 for Treatment Month 5 Phone Visit will be automatically generated on her ClinCard.       Subject's Treatment Month 5 Phone Visit will be documented in Rave and OnCore.  Subject was reminded that her Treatment Month 6 will be conducted in person or a virtual visit once a decision has been made by [facility].  Subject was reminded that BUSTER Mccarthy will call in advance to schedule appointment and/or give her a status  update regarding her next Month 6 study visit.     Subject visit completed. She denies any questions or concerns at this time.  Phone Contact Visit ended at 9:35am.       Subject's stipend of $25 for Year 1 Treatment Month 5 Phone Visit will be automatically generated on her ClinCard.

## 2020-05-19 DIAGNOSIS — Z00.6 RESEARCH STUDY PATIENT: ICD-10-CM

## 2020-05-20 ENCOUNTER — RESEARCH ENCOUNTER (OUTPATIENT)
Dept: RESEARCH | Facility: OTHER | Age: 35
End: 2020-05-20
Payer: COMMERCIAL

## 2020-05-25 NOTE — PROGRESS NOTES
[employer]  Treatment Month 6 Study Visit was conducted remotely during COVID-19 on 20-May-2020.       Inform Consent Form ICF)  Re-Consent Documentation: Before any activities or procedures were conducted, BUSTER Mccarthy met with Alden Hanna on 20-NOV-2019 @ 4:30pm to re-consent Subject.  BUSTER Mccarthy reviewed amended consent for [employer] Study  (IRB #: 2017.414 approved 12-Nov-2019) with Subject.  Ample time was given for Subject to review, ask further questions before signing consent.  The Subject verbally agreed to continue her participation in the the study and signed Informed Consent (IRB #: 2017.414 approved 11/12/2019).  A copy of signed consent was given to Subject and uploaded into Epic.      Date:  20-May-2020 @ 12:30pm  Study: Phase 3b Study to Evaluate the Long-Term Safety and Efficacy of Elagolix in Combination with Estradiol/Norethindrone Acetate for the Management of Heavy Menstrual Bleeding Associated with Uterine Fibroids in Premenopausal Women.           Sponsor:  [employer]   IRB/Protocol #: 2017.414 (Approved 01/22/2019)  : Lisa Stewart MD  Sub-Investigators:  Magnus Roa MD and Ant Briscoe MD  Site Number: [id number]   Visit:  Month 6 Treatment Remote Study Visit     This is just a refresher of what the study team sent out to the sites for remote study visits during COVID-19:                    Questionnaires that are typically completed by the subject during on-site visits via the electronic device (Slate/Tablet; UFS-QoL, PGIC-MB) cannot be completed on the Slate/Tablet if the visit needs to be conducted over the phone due to COVID-19. For these instances, where the subject is unable to complete an on-site visit, sites should administer these questionnaires over the phone. (note, these questionnaires are in addition to the C-SSRS. As the C-SSRS is conducted by the site, this can be administered and entered in the Slate/Tablet by the site in the usual way). The  other questionnaires would be conducted via phone and documented on the (attached) paper source document. Please note, this process is only applicable for visits being conducted remotely due to COVID-19.      ______________________________________________________________________________________________________________________________________________________________________________________________________________________     Remote Assessment/Procedure for Treatment Month 6 Study Visit on 20 MAY 2020 @ 12:30am.  Subject reported to only pick-up IP and answer questionnaires:     Month  Treatment Vital Signs:  Remote study visit.  Vitals not taken.  Per sponsor, vitals can be delayed up to 1 month from its scheduled time.               Systolic blood pressure:  mm Hg               Diastolic blood pressure:  mm Hg              Pulse:  beats/min              Respiratory Rate:  (breaths/min)             Temperature:  F              Weight:                Height:      DXA:  Request for DXA order was sent to Dr. Stewart.  Once placed, BUSTER Mccarthy will schedule an appointment with Imaging to have DXA performed.  Subject will be notified of date/time.       Dispense Sanitary Products and Collection Kit & Return Sanitary Products; draw venous blood sample.  These procedures are no longer required since Protocol Amendment 3 approved by IRB on 12-Nov-2019 was amended to stop PCV while subjects are in treatment phase.       Clinical Laboratory/Safety Labs Tests:   Remote study visit.  Clinical Laboratory/Safety Labs Tests not taken.  Per sponsor, Laboratory samples can be delayed up to 1 month from its scheduled time.       Urine Pregnancy Test:   Remote study visit.  Pregnancy test was administered at home at 11:45am before subject picked up IP.  HCG Cassette Rapid Test.   Negative result.      Contraception Counseling/Dispense Contraceptives as Necessary:  Contraception counseling was conducted.  There has not been a  change/new method since last visit.  Subject is not required to use dual contraception methods since she practices total abstinence from sexual intercourse, as the preferred lifestyle of the subject.  Subject was reminded that periodic abstinence is not acceptable.     Subject to Complete Birth Control Attestation:  Since subject is practicing total abstinence she is not required to sign attestation, unless her sexual activity status changes during her participation in this study.     Administer ePRO/Remind subject to complete e-Diary:  BUSTER Mccarthy completed CSSRS to be completed by Clinician.  Questionnaires that are typically completed by the subject during on-site visits via the electronic device (Slate/Tablet; UFS-QoL, PGIC-MB) cannot be completed on the Slate/Tablet if the visit needs to be conducted over the phone due to COVID-19. For these instances, where the subject is unable to complete an on-site visit, sites should administer these questionnaires over the phone. (note, these questionnaires are in addition to the C-SSRS. As the C-SSRS is conducted by the site, this can be administered and entered in the Slate/Tablet by the site in the usual way). The other questionnaires would be conducted via phone and documented on the (attached) paper source document. Please note, this process is only applicable for visits being conducted remotely due to COVID-19.         [place] in IRT:  BUSTER Mccarthy entered Month 6 visit in [facility].       Study Drug Administration / Accountability:  Subject was dispensed the following study drug kits below during the remote study visit    Subject Dispense Kit     Dispensing Date Kit Desc Lot ID Info Kit ID B/D Qty       20-May-2020       [id number] D 1       20-May-2020       [id number] D 1       20-May-2020       [id number]  D 1       20-May-2020       [id number] D 1       20-May-2020       [id number] D 1       20-May-2020       [id number] D 1             Study Drug Accountability:  Subject returned  all study drug kits that were dispensed on 13-Feb-2020.  Refer to Subject Dispense/Return Report for Site 767 for study drug accountability.     Subject Dispense Kit     Dispensing Date Kit Desc Lot ID Info Kit ID B/D Qty       13-Feb-2020       [id number] D 1       13-Feb-2020       [id number] D 1       13-Feb-2020       [id number] D 1       13-Feb-2020       [id number] D 1       13-Feb-2020       [id number] D 1       13-Feb-2020       [id number] D 1             Adverse Event Monitoring:  Subject did not report any new AE's or symptoms during this study visit.       Assessment Review/Update Concomitant Medications:  BUSTER Mccarthy reviewed/updated concomitant mediations with subject.       $50 for Treatment Month 6 will be placed on subject's ClinCard automatically by [employer].     After questionnaires were completed and subject received IP, BUSTER Mccarthy asked Subject if she had any questions or concerns.  Subject reported that she did not have any questions or concerns.  Subject was reminded to take her medications as prescribed daily.  Subject will be reminded of COVID-19 [facility] and [employer] study updates.   Next scheduled visits:  Month 7 & 8 phone visits (around the 20th of June & July); Month 9  On 29-Jul-2020 -4/+4 days.        Subject was reminded that the following procedures need to be conducted by 20-Jun-2020:  · Mammogram  · Pap Test  · Endometrial Biopsy  · Pelvic Ultrasound:  TAU, TVU  · DXA     The following assessments will be conducted by Dr. Stewart during Subject's next scheduled in-clinic visit once [facility] is allowed to start seeing patients face-to-face:  · Complete Physical Examination  · Gynecological Examination  · Vital Signs  · Clinical Laboratory/Safety Labs Test

## 2020-05-26 DIAGNOSIS — Z00.6 RESEARCH EXAM: Primary | ICD-10-CM

## 2020-06-03 ENCOUNTER — DOCUMENTATION ONLY (OUTPATIENT)
Dept: RESEARCH | Facility: OTHER | Age: 35
End: 2020-06-03

## 2020-06-03 ENCOUNTER — HOSPITAL ENCOUNTER (OUTPATIENT)
Dept: RADIOLOGY | Facility: OTHER | Age: 35
Discharge: HOME | End: 2020-06-03
Attending: OBSTETRICS & GYNECOLOGY
Payer: COMMERCIAL

## 2020-06-03 DIAGNOSIS — Z00.6 RESEARCH EXAM: ICD-10-CM

## 2020-06-03 PROCEDURE — 77080 DEXA BONE DENSITY SPINE HIP: ICD-10-PCS | Mod: 26,,, | Performed by: RADIOLOGY

## 2020-06-03 PROCEDURE — 77080 DXA BONE DENSITY AXIAL: CPT | Mod: TC

## 2020-06-03 PROCEDURE — 77080 DXA BONE DENSITY AXIAL: CPT | Mod: 26,,, | Performed by: RADIOLOGY

## 2020-06-06 ENCOUNTER — TELEPHONE (OUTPATIENT)
Dept: RESEARCH | Facility: OTHER | Age: 35
End: 2020-06-06

## 2020-06-07 NOTE — TELEPHONE ENCOUNTER
Date:  06-Jun-2020 @ 7:00pm  Study: Phase 3b Study to Evaluate the Long-Term Safety and Efficacy of Elagolix in Combination with Estradiol/Norethindrone Acetate for the Management of Heavy Menstrual Bleeding Associated with Uterine Fibroids in Premenopausal Women.           Sponsor:  [employer]   IRB/Protocol #: 2017.414 (Approved 01/22/2019)  : Lisa Stewart MD  Sub-Investigators:  aMgnus Roa MD and Ant Briscoe MD  Site Number: 767   Visit:  Treatment Month 7 Telephone Study Visit      is a Phase 3b Study to Evaluate the Long-Term Safety and Efficacy of Elagolix in Combination with Estradiol/Norethindrone Acetate for the Management of Heavy Menstrual Bleeding Associated with Uterine Fibroids in Premenopausal Women.        Inform Consent Form ICF)  Re-Consent Documentation: Before any activities or procedures were conducted, BUSTER Mccarthy met with Alden Hanna on 20-NOV-2019 @ 4:30pm to re-consent Subject.  BUSTER Mccarthy reviewed amended consent for [employer] Study  (IRB #: 2017.414 approved 12-Nov-2019) with Subject.  Ample time was given for Subject to review, ask further questions before signing consent.  The Subject verbally agreed to continue her participation in the the study and signed Informed Consent (IRB #: 2017.414 approved 11/12/2019).  A copy of signed consent was given to Subject and uploaded into Epic.       Assessment for  Treatment Month 7 Telephone Contact Summary:     Subject was updated with the latest COVID-19 patient visit from the Sponsor and CTU Research. Subject was reminded to continue taking her IP as instructed.  Subject was instructed to note the date and time of her 4th to last dose, 3rd, 2nd and last of IP.  Subject's next scheduled Month 7 will be phone study visit.       BUSTER Mccarthy contacted Subject to conduct Treatment Month 7 Phone Visit.     Remind Subject to start collecting sanitary products:  Per protocol Amendment 3 approved on  12 NOV 2019, Subject is no longer required to collect sanitary products..       Treatment Month 7 Subject Self-Administered Home Urine Pregnancy Test:  Pregnancy test performed by Subject at home @ 6:45pm    Result: Negative.    SAS Pregnancy Urine Test       Treatment Month 7 Contraception Counseling/Dispensing:    Contraception counseling was performed.  There has not been a change/new method since last visit. Subject was reminded that she can not become pregnant while participating in the study. Subject expressed understanding.     Treatment Month 7 Concomitant Medications/Adverse Event Monitoring:   Concomitant medications were assessed.  Subject did not take any over the counter or prescribed medications since her last Remote Month 6 study visit on 20-May-2020.   Concomitant Medications Log updated to reflect.       Adverse Event Monitoring:  Subject did not report any adverse events during this Month 7 telephone study visit.  Subject was informed that Dr. Stewart will address the Adverse Event when she reports for her clinic study visit.      Subject was informed that with Protocol Amendment 3 e-Diary data collection is no longer required.       Subject's stipend of $25 for Treatment Month 7 Phone Visit will be automatically generated on her ClinCard.       Subject's Treatment Month 7 Phone Visit will be documented in Rave and OnCore.  Subject was reminded that her Treatment Month 8 will be a Telephone Study Visit.  Also, Subject was reminded that the Clinical Trials Unit (CTU) is still not seeing patients face-to-face.  She will be kept abreast of COVID-19 updates and once a decision has been made by [facility]'s Leadership.  Subject was reminded that [facility] will call in advance to schedule appointment and/or give her a status update regarding her next Month 8 study visit.     Subject visit completed. She denies any questions or concerns at this time.  Phone Contact Visit ended at 7:30pm.       Subject's  stipend of $25 for Year 1 Treatment Month 7 Phone Visit will be automatically generated on her ClinCard.

## 2020-06-18 NOTE — PROGRESS NOTES
[employer]  Treatment Month 6 Study Visit was conducted remotely during COVID-19 on 20-May-2020.       Inform Consent Form ICF)  Re-Consent Documentation: Before any activities or procedures were conducted, BUSTER Mccarthy met with Alden Hanna on 20-NOV-2019 @ 4:30pm to re-consent Subject.  BUSTER Mccarthy reviewed amended consent for [employer] Study  (IRB #: 2017.414 approved 12-Nov-2019) with Subject.  Ample time was given for Subject to review, ask further questions before signing consent.  The Subject verbally agreed to continue her participation in the the study and signed Informed Consent (IRB #: 2017.414 approved 11/12/2019).  A copy of signed consent was given to Subject and uploaded into Epic.      Date:  20-May-2020 @ 12:30pm  Study: Phase 3b Study to Evaluate the Long-Term Safety and Efficacy of Elagolix in Combination with Estradiol/Norethindrone Acetate for the Management of Heavy Menstrual Bleeding Associated with Uterine Fibroids in Premenopausal Women.           Sponsor:  [employer]   IRB/Protocol #: 2017.414 (Approved 01/22/2019)  : Lisa Stewart MD  Sub-Investigators:  Magnus Roa MD and Ant Briscoe MD  Site Number: 767   Visit:  Month 6 Treatment Remote Study Visit       Abb[employer]ie  Treatment Month 6 Study Visit  Bone Mineral Density (BMD) DXA Scan conducted on 03-Jun-2020 in Imaging Department as required per protocol.    Procedure was noted in EDC and OnCore.  Stipend will automatically post to Subject's ClinCard.

## 2020-07-04 ENCOUNTER — TELEPHONE (OUTPATIENT)
Dept: RESEARCH | Facility: OTHER | Age: 35
End: 2020-07-04

## 2020-07-04 NOTE — TELEPHONE ENCOUNTER
Date:  04-Jul-2020 @ 11:05am  Study: Phase 3b Study to Evaluate the Long-Term Safety and Efficacy of Elagolix in Combination with Estradiol/Norethindrone Acetate for the Management of Heavy Menstrual Bleeding Associated with Uterine Fibroids in Premenopausal Women.           Sponsor:  [employer]   IRB/Protocol #: 2017.414 (Approved 01/22/2019)  : Lisa Stewart MD  Sub-Investigators:  Magnus Roa MD and Ant Briscoe MD  Site Number: 767   Visit:  Treatment Month 8 Telephone Study Visit      is a Phase 3b Study to Evaluate the Long-Term Safety and Efficacy of Elagolix in Combination with Estradiol/Norethindrone Acetate for the Management of Heavy Menstrual Bleeding Associated with Uterine Fibroids in Premenopausal Women.        Inform Consent Form ICF)  Re-Consent Documentation: Before any activities or procedures were conducted, BUSTER Mccarthy met with Alden Hanna on 20-NOV-2019 @ 4:30pm to re-consent Subject.  BUSTER Mccarthy reviewed amended consent for [employer] Study  (IRB #: 2017.414 approved 12-Nov-2019) with Subject.  Ample time was given for Subject to review, ask further questions before signing consent.  The Subject verbally agreed to continue her participation in the the study and signed Informed Consent (IRB #: 2017.414 approved 11/12/2019).  A copy of signed consent was given to Subject and uploaded into Epic.       Assessment for  Treatment Month 8 Telephone Contact Summary:     Subject was updated with the latest COVID-19 patient visit from the Sponsor and CTU Research. Subject was reminded to continue taking her IP as instructed.  Subject was instructed to note the date and time of her 4th to last dose, 3rd, 2nd and last of IP.  Subject's next scheduled Month 9 is targeted for 07/28/2020 +/- 4 days in CTU.       BUSTER Mccarthy contacted Subject to conduct Treatment Month 8 Phone Visit.     Remind Subject to start collecting sanitary products:  Per protocol  Amendment 3 approved on 12 NOV 2019, Subject is no longer required to collect sanitary products.      Treatment Month 8 Subject Self-Administered Home Urine Pregnancy Test:  Pregnancy test performed by Subject at home @ [id number]    Result: Negative.    SAS Pregnancy Urine Test       Treatment Month 8 Contraception Counseling/Dispensing:    Contraception counseling was performed.  There has not been a change/new method since last visit. Subject was reminded that she can not become pregnant while participating in the study. Subject expressed understanding.     Treatment Month 8 Concomitant Medications/Adverse Event Monitoring:   Concomitant medications were assessed.  Subject did not take any over the counter or prescribed medications since her last Remote Month 7 study visit on 06-Jun-2020.   Concomitant Medications Log updated to reflect.       Adverse Event Monitoring:  Subject did not report any adverse events during this Month 8 telephone study visit.  Subject was informed that Dr. Stewart will address the Adverse Event when she reports for her clinic study visit.        Subject was informed that with Protocol Amendment 3 e-Diary data collection is no longer required.       Subject's stipend of $25 for Treatment Month 7 Phone Visit will be automatically generated on her ClinCard.       Subject's Treatment Month 8 Phone Visit will be documented in Rave and OnCore.  Subject was reminded that her Treatment Month 9 is an in-person visit.  Also, Subject was reminded that the Clinical Trials Unit (CTU) is still not seeing patients face-to-face. She will be kept abreast of COVID-19 updates and once a decision has been made by [facility] Leadership.      Subject was reminded that [facility] will call in advance to schedule appointment and/or give her a status update regarding her next Month 9 study visit which is targeted for 07/28/2020 +/- 4 days on 08/01/2020.     Subject visit completed. She denies any questions or  concerns at this time.  Phone Contact Visit ended at 11:15am.       Subject's stipend of $25 for Year 1 Treatment Month 8 Phone Visit will be automatically generated on her ClinCard.

## 2020-07-14 ENCOUNTER — TELEPHONE (OUTPATIENT)
Dept: RESEARCH | Facility: OTHER | Age: 35
End: 2020-07-14

## 2020-07-14 NOTE — TELEPHONE ENCOUNTER
Date:  14-Ju1-2020 @ 12:20pm  Study: Phase 3b Study to Evaluate the Long-Term Safety and Efficacy of Elagolix in Combination with Estradiol/Norethindrone Acetate for the Management of Heavy Menstrual Bleeding Associated with Uterine Fibroids in Premenopausal Women.           Sponsor:  [employer]   IRB/Protocol #: 2017.414 (Approved 01/22/2019)  : Lisa Stewart MD  Sub-Investigators:  Magnus Roa MD and Ant Briscoe MD  Site Number: 767   Visit:  Treatment Month 7 Telephone Study Visit      is a Phase 3b Study to Evaluate the Long-Term Safety and Efficacy of Elagolix in Combination with Estradiol/Norethindrone Acetate for the Management of Heavy Menstrual Bleeding Associated with Uterine Fibroids in Premenopausal Women.        Inform Consent Form ICF)  Re-Consent Documentation: Before any activities or procedures were conducted, BUSTER Mccarthy met with Alden Hanna on 20-NOV-2019 @ 4:30pm to re-consent Subject.  BUSTER Mccarthy reviewed amended consent for [employer] Study  (IRB #: 2017.414 approved 12-Nov-2019) with Subject.  Ample time was given for Subject to review, ask further questions before signing consent.  The Subject verbally agreed to continue her participation in the the study and signed Informed Consent (IRB #: 2017.414 approved 11/12/2019).  A copy of signed consent was given to Subject and uploaded into Epic.      Telephone Summary:    Research Patient contacted BUSTER Mccarthy to reschedule her appointment that was scheduled for 13-Jul-2020 @ 2:00pm with Dr. Stewart to address AE reported and BUSTER Mccarthy to collect vital signs and clinical labs from Month 6 study visit scheduled on 20-May-2020.  Due to COVID-19 restrictions (no face-to-face clinic visits), BUSTER Mccarthy was unable to collect vitals and labs.    Research Patient wanted to schedule collection of vital signs and labs this Friday, 17-Jul-2020.  BUSTER Mccarthy informed Research Patient that she will contact [facility]  to ensure that samples could be shipped out on Friday.  BUSTER Mccarthy will confirm and contact Research Patient.      NOTE:  BUSTER Mccarthy contacted [facility].  They are receiving samples on Saturdays.    BUSTER Mccarthy discussed with Research Patient her Adverse Event (pelvic pain and pain shooting down legs to ankles) reported on 17-Oct-2019.  It was determined that these symptoms occurred after Research Patient had a repeat SIS and Endo Biopsy performed and not as a result of the IP.      Also, BUSTER Mccarthy discussed the Adverse Event (hair shedding/hair loss Research Patient reported during her Month 5 Phone Visit on 13-May-2020.  Research Patient reported today that she feels like her shedding/loss of hair was not associated with the study IP, but with her chosen hairstyles and hair products.  Research Patient was informed that she will not have to see Dr. Stewart for any further assessment.      BUSTER Mccarthy asked Research Patient if she had any other questions and/or concerns.  Research Patient reported no.      Patient was reminded that her Month 9 visit must be scheduled before 01-Aug-2020.  BUSTER Mccartyh will contact her to confirm whether or not she can appear on Friday, 17-Jul-2020 to have her labs drawn and shipped to [facility].

## 2020-07-20 ENCOUNTER — DOCUMENTATION ONLY (OUTPATIENT)
Dept: RESEARCH | Facility: OTHER | Age: 35
End: 2020-07-20

## 2020-07-20 NOTE — PROGRESS NOTES
A[employer]ie  Treatment Month 6 Study Visit was conducted remotely during COVID-19 on 20-May-2020.       Inform Consent Form ICF)  Re-Consent Documentation: Before any activities or procedures were conducted, BUSTER Mccarthy met with Alden Hanna on 20-NOV-2019 @ 4:30pm to re-consent Subject.  BUSTER Mccarthy reviewed amended consent for [employer] Study  (IRB #: 2017.414 approved 12-Nov-2019) with Subject.  Ample time was given for Subject to review, ask further questions before signing consent.  The Subject verbally agreed to continue her participation in the the study and signed Informed Consent (IRB #: 2017.414 approved 11/12/2019).  A copy of signed consent was given to Subject and uploaded into Epic.      Date:  20-May-2020 @ 12:30pm  Study: Phase 3b Study to Evaluate the Long-Term Safety and Efficacy of Elagolix in Combination with Estradiol/Norethindrone Acetate for the Management of Heavy Menstrual Bleeding Associated with Uterine Fibroids in Premenopausal Women.           Sponsor:  [employer]   IRB/Protocol #: 2017.414 (Approved 01/22/2019)  : Lisa Stewart MD  Sub-Investigators:  Magnus Roa MD and Ant Briscoe MD  Site Number: 767   Visit:  Month 6 Treatment Remote Study Visit   ___________________________________________________________________      20-JUL-2020 @ 11:30AM  CONTINUATION VISIT OF MONTH 6 STUDY VISIT TO COMPLETE PROCEDURES/ASSESSMENT THAT WERE NOT DONE ON 20-MAY-2020 DUE TO COVID-19 RESTRICTIONS.    Month 6 Treatment Vital Signs:  Time vital signs taken: 12:04pm  Systolic blood pressure:  mm Hg 148   Diastolic blood pressure:  mm Hg 98   Pulse:  80 beats/min  Respiratory Rate: 18 (breaths/min)  Temperature:  97.9 F  Weight:  100.55kg      Lab Samples:  Sample collections of Chemistry Panel Subset, Lip Panel; ELAG/COLLINS PK Plasma and E2PD samples were collected at 12:27pm in [facility].  Preparation and handling instructions were followed as detailed in Laboratory  Manual.  Collection of lab samples were collected without any difficulty.       scheduled via [employer]/UPS.  Tracking #:  [id number].  Confirmation # [id number].    Subject was reminded to continue taking her IP as instructed.  Subject was instructed to note the date and time of her 4th to last dose, 3rd, 2nd and last of IP.  Also, Subject was reminded to bring in all IP boxes when she comes for her Month 9 study visit.    Subject tentatively scheduled her Month 9 Treatment Study Visit for 29-Jul-2020 @ 4:30pm or 31-Jul-2020 @ 12:00 Noon.  Subject will contact [facility] to confirm.     Subject was asked if she had any questions or concerns.  Subject reported no.  Due to COVID-19 restrictions (CTU not seeing patients face-to-face), this is a continuation of assessments/procedures for when she appeared on 20-May-2020.  Subject will not be awarded an additional study stipend for appearing for today's visit.

## 2020-07-30 ENCOUNTER — RESEARCH ENCOUNTER (OUTPATIENT)
Dept: RESEARCH | Facility: OTHER | Age: 35
End: 2020-07-30

## 2020-07-30 VITALS
TEMPERATURE: 99 F | WEIGHT: 224.31 LBS | DIASTOLIC BLOOD PRESSURE: 96 MMHG | SYSTOLIC BLOOD PRESSURE: 140 MMHG | BODY MASS INDEX: 38.5 KG/M2 | RESPIRATION RATE: 18 BRPM | HEART RATE: 87 BPM

## 2020-07-30 DIAGNOSIS — Z00.6 RESEARCH STUDY PATIENT: ICD-10-CM

## 2020-07-30 NOTE — PROGRESS NOTES
"  Inform Consent Form ICF)  Re-Consent Documentation: Before any activities or procedures were conducted, BUSTER Mccarthy met with Alden Hanna on 20-NOV-2019 @ 4:30pm to re-consent Subject.  BUSTER Mccarthy reviewed amended consent for [employer] Study  (IRB #: 2017.414 approved 12-Nov-2019) with Subject.  Ample time was given for Subject to review, ask further questions before signing consent.  The Subject verbally agreed to continue her participation in the the study and signed Informed Consent (IRB #: 2017.414 approved 11/12/2019).  A copy of signed consent was given to Subject and uploaded into Epic.      Date:  30-Jul-2020 @ 9:00am  Study: Phase 3b Study to Evaluate the Long-Term Safety and Efficacy of Elagolix in Combination with Estradiol/Norethindrone Acetate for the Management of Heavy Menstrual Bleeding Associated with Uterine Fibroids in Premenopausal Women.           Sponsor:  [employer]   IRB/Protocol #: 2017.414 (Approved 01/22/2019)  : Lisa Stewart MD  Sub-Investigators:  Magnus Roa MD and Ant Briscoe MD  Site Number: 767   Visit:  Month 9 Treatment Study Visit         Month 9 Treatment Vital Signs taken at 09:15am:              Systolic blood pressure:  140 mm Hg               Diastolic blood pressure:  96 mm Hg              Pulse:  87 beats/min              Respiratory Rate:  18 (breaths/min)             Temperature:  98.F              Weight:  101.75              Height: 5'4"     Dispense Sanitary Products and Collection Kit & Return Sanitary Products; draw venous blood sample.  These procedures are no longer required since Protocol Amendment 3 approved by IRB on 12-Nov-2019 was amended to stop PCV while subjects are in treatment phase.       Clinical Laboratory/Safety Labs Tests:   Chemistry Panel Subset, Lip Panel; ELAB//COLLINS PK Plasma and E2 PD samples were collected at 09:25am.  Samples were collected, processed, packaged and shipped per Lab Manual.  [employer]/UPS " was called to  samples.  Confirmation #:  [id number].     Urine Pregnancy Test:  SAS Pregnancy Urine Text conducted at 09:10am.  Expiration April 2021   Negative result.      Contraception Counseling/Dispense Contraceptives as Necessary:  Contraception counseling was conducted.  There has not been a change/new method since last visit.  Subject is not required to use dual contraception methods since she practices total abstinence from sexual intercourse, as the preferred lifestyle of the subject.  Subject was reminded that periodic abstinence is not acceptable.     Subject to Complete Birth Control Attestation:  Since subject is practicing total abstinence she is not required to sign attestation, unless her sexual activity status changes during her participation in this study.     Administer ePRO/Remind subject to complete e-Diary:  BUSTER Mccarthy completed CSSRS to be completed by Clinician.  Participant was handed tablet to complete questionnaire.   Subjects are no longer required to complete e-Diary entries daily.  Subject returned e-Touch Diary  [id number]     [place] in IRT:  BUSTER Mccarthy entered Month 9 visit in [facility].       Study Drug Administration / Accountability:  Subject was dispensed the following study drug kits below during the remote study visit                  Subject Dispense Kit     Visit ID Dispensing Date Kit Desc Lot ID Info Kit ID B/D Qty       Month 9 30-Jul-2020       [id number] D 1       Month 9 30-Jul-2020       [id number] D 1       Month 9 30-Jul-2020       [id number] D 1       Month 9 30-Jul-2020       [id number] D 1       Month 9 30-Jul-2020       [id number] D 1       Month 9 30-Jul-2020       [id number] D 1               Study Drug Accountability:  Subject returned all study drug kits that were dispensed on 20-May-2020.  Refer to Subject Dispense/Return Report for Site [facility] for study drug accountability.                Subject Dispense Kit     Dispensing Date Kit Desc Lot ID Info Kit ID B/D Qty        20-May-2020       [id number] D 1       20-May-2020       [id number] D 1       20-May-2020       [id number] D 1       20-May-2020       [id number] D 1       20-May-2020       [id number] D 1       20-May-2020       [id number] D 1              Adverse Event Monitoring:  Subject did not report any new AE's or symptoms during this study visit.       Assessment Review/Update Concomitant Medications:  BUSTER Mccarthy reviewed/updated concomitant mediations with subject.  There were no updates.    After questionnaires were completed and subject received IP, BUSTER Mccarthy asked Subject if she had any questions or concerns.  Subject reported that she did not have any questions or concerns.  Subject was reminded to take her medications as prescribed daily.  Subject was given 6 pregnancy tests (at home) for her telephone study visits.      Next scheduled visits:    Month 10 - Phone Visit:  Minus 4 Days: 08/21/2020  Target Date:  08/25/2020   Plus 4 Days: 08/29/2020  Month 11 - Phone Visit:  Minus 4 Days: 09/18/2020  Target Date:  09/22/2020  Plus 4 Days: 09/26/2020  Month 12 - In Person Visit:  Minus 4 Days:  10/05/2020  Target Date:  10/20/2020   Plus 4 Days:  10/24/2020    $50 for Treatment Month 9 will be placed on subject's ClinCard automatically by [employer].

## 2020-08-31 ENCOUNTER — RESEARCH ENCOUNTER (OUTPATIENT)
Dept: RESEARCH | Facility: OTHER | Age: 35
End: 2020-08-31

## 2020-08-31 ENCOUNTER — TELEPHONE (OUTPATIENT)
Dept: RESEARCH | Facility: OTHER | Age: 35
End: 2020-08-31

## 2020-08-31 NOTE — TELEPHONE ENCOUNTER
Date:  29-Aug-2020 @ 6:30pm  Study: Phase 3b Study to Evaluate the Long-Term Safety and Efficacy of Elagolix in Combination with Estradiol/Norethindrone Acetate for the Management of Heavy Menstrual Bleeding Associated with Uterine Fibroids in Premenopausal Women.           Sponsor:  [employer]   IRB/Protocol #: 2017.414 (Approved 01/22/2019)  : Lisa Stewart MD  Sub-Investigators:  Magnus Roa MD and Ant Briscoe MD  Site Number: 767   Visit:  Treatment Month 10 Telephone Study Visit      is a Phase 3b Study to Evaluate the Long-Term Safety and Efficacy of Elagolix in Combination with Estradiol/Norethindrone Acetate for the Management of Heavy Menstrual Bleeding Associated with Uterine Fibroids in Premenopausal Women.        Inform Consent Form ICF) Re-Consent Documentation: Before any activities or procedures were conducted, BUSTER Mccarthy met with Alden Schmidtiam on 20-NOV-2019 @ 4:30pm to re-consent Subject.  BUSTER Mccarthy reviewed amended consent for Abb[employer]ie Study  (IRB #: 2017.414 approved 12-Nov-2019) with Subject.  Ample time was given for Subject to review, ask further questions before signing consent.  The Subject verbally agreed to continue her participation in the the study and signed Informed Consent (IRB #: 2017.414 approved 11/12/2019).  A copy of signed consent was given to Subject and uploaded into Epic.          NOTE:  After Telephone Contact for Month 10 was conducted 29-Aug-2020 @ 6;30pm, I was unable to access Epic to document encounter.    Assessment for  Treatment Month 10 Telephone Contact Summary:     BUSTER Mccarthy contacted Subject to conduct Treatment Month 10 Phone Visit.    Subject was updated with the latest COVID-19 patient visit from the Sponsor and [employer]. Subject was reminded to continue taking her IP as instructed.  Subject was instructed to note the date and time of her 4th to last dose, 3rd, 2nd and last of IP.  Subject's next  scheduled Month 11 will be phone study visit.          Remind Subject to start collecting sanitary products:  Per protocol Amendment 3 approved on 12 NOV 2019, Subject is no longer required to collect sanitary products..       Treatment Month 10 Subject Self-Administered Home Urine Pregnancy Test:  Pregnancy test performed by Subject at home @ 6:15pm    Result: Negative.    SAS Pregnancy Urine Test       Treatment Month 10 Contraception Counseling/Dispensing:    Contraception counseling was performed.  There has not been a change/new method since last visit. Subject was reminded that she can not become pregnant while participating in the study. Subject expressed understanding.     Treatment Month 10 Concomitant Medications:   Concomitant medications were assessed.  Subject did not take any over the counter or prescribed medications since her last Remote Month 9 study visit on 30-Jul-2020.        Treatment Month 10 Adverse Event Monitoring:  Subject did not report any adverse events during this Month 10 telephone study visit.       Subject's stipend of $25 for Treatment Month 10 Phone Visit will be automatically generated on her ClinCard.       Subject's Treatment Month 10 Phone Visit will be documented in Rave and OnCore.  Subject was reminded that her Treatment Month 11 will be a Telephone Study Visit.       Month 11 Scheduled Visit:  Target Date: 09/22/2020  Minus 4 days: 09/18/2020  Plus 4 days: 09/26/2020    Subject was also reminded that BUSTER Mccarthy will call in advance to schedule appointment and/or give her a status update regarding her next Month 8 study visit.     Subject visit completed. She denies any questions or concerns at this time.  Phone Contact Visit ended at 7:00pm.       Subject's stipend of $25 for Year 1 Treatment Month 10 Phone Visit will be automatically generated on her ClinCard.

## 2020-09-28 ENCOUNTER — TELEPHONE (OUTPATIENT)
Dept: RESEARCH | Facility: OTHER | Age: 35
End: 2020-09-28

## 2020-09-28 NOTE — TELEPHONE ENCOUNTER
Date:  25-Sep-2020 @ 5:10pm  Study: Phase 3b Study to Evaluate the Long-Term Safety and Efficacy of Elagolix in Combination with Estradiol/Norethindrone Acetate for the Management of Heavy Menstrual Bleeding Associated with Uterine Fibroids in Premenopausal Women.           Sponsor:  [employer]   IRB/Protocol #: 2017.414 (Approved 01/22/2019)  : Lisa Stewart MD  Sub-Investigators:  Magnus Roa MD and Ant Briscoe MD  Site Number: 767   Visit:  Treatment Month 11 Telephone Study Visit      is a Phase 3b Study to Evaluate the Long-Term Safety and Efficacy of Elagolix in Combination with Estradiol/Norethindrone Acetate for the Management of Heavy Menstrual Bleeding Associated with Uterine Fibroids in Premenopausal Women.        Inform Consent Form ICF) Re-Consent Documentation: Before any activities or procedures were conducted, BUSTER Mccarthy met with Alden Hanna on 20-NOV-2019 @ 4:30pm to re-consent Subject.  BUSTER Mccarthy reviewed amended consent for AbbVie Study  (IRB #: 2017.414 approved 12-Nov-2019) with Subject.  Ample time was given for Subject to review, ask further questions before signing consent.  The Subject verbally agreed to continue her participation in the the study and signed Informed Consent (IRB #: 2017.414 approved 11/12/2019).  A copy of signed consent was given to Subject and uploaded into Epic.          Assessment for  Treatment Month 11 Telephone Contact Summary:     Subject was updated with the latest COVID-19 patient visit from the Sponsor and [employer]. Subject was reminded to continue taking her IP as instructed.  Subject was instructed to note the date and time of her 4th to last dose, 3rd, 2nd and last of IP.  Subject's next scheduled Month 12 will be an in-person clinic visit -4days (10/16/2020)  Target Date (10/20/2020) or +4 days (10/24/2020).       Subject contacted BUSTER Mccarthy to conduct Treatment Month 11 Phone Visit.     Remind  Subject to start collecting sanitary products:  Per protocol Amendment 3 approved on 12 NOV 2019, Subject is no longer required to collect sanitary products..       Treatment Month 111 Subject Self-Administered Home Urine Pregnancy Test:  Pregnancy test performed by Subject at home @ 5:00pm    Result: Negative.    SAS Pregnancy Urine Test       Treatment Month 11 Contraception Counseling/Dispensing:    Contraception counseling was performed.  There has not been a change/new method since last visit. Subject was reminded that she can not become pregnant while participating in the study. Subject expressed understanding.     Treatment Month 11 Concomitant Medications:   Concomitant medications were assessed.  Subject did not take any over the counter or prescribed medications since her last telephone Month 10 on 29-Aug-2020.        Treatment Month 11 Adverse Event Monitoring:  Subject did not report any adverse events during this Month 11 telephone study visit.       Subject's stipend of $25 for Treatment Month 11 Phone Visit will be automatically generated on her ClinCard.       Subject's Treatment Month 11 Phone Visit will be documented in Rave and OnCore.  Subject was reminded that her Treatment Month 12 will be an in-person visit.        Upcoming Month 12 Scheduled Visit:  Target Date: 10/20/2020  Minus 4 days: 10/16/2020  Plus 4 days: 10/24/2020    Subject was also reminded that BUSTER Mccarthy will call in advance to schedule appointment and/or give her a status update regarding her next Month 12 study visit.     Subject visit completed. She denies any questions or concerns at this time.  Phone Contact Visit ended at 5:20pm.       Subject's stipend of $25 for Year 1 Treatment Month 11 Phone Visit will be automatically generated on her ClinCard.

## 2020-10-27 ENCOUNTER — TELEPHONE (OUTPATIENT)
Dept: OBSTETRICS AND GYNECOLOGY | Facility: CLINIC | Age: 35
End: 2020-10-27

## 2020-10-27 DIAGNOSIS — Z00.6 RESEARCH STUDY PATIENT: Primary | ICD-10-CM

## 2020-10-27 NOTE — TELEPHONE ENCOUNTER
----- Message from Arun Mccarthy sent at 10/27/2020  7:29 AM CDT -----  Regarding: Place Order for AbbVie  - Month 12 Visit  Good morning Dr. Stewart,    Can you place the following orders for Alden's Month 12 Study Visit?  I'm not certain if you need to place an order for the biopsy......  Thanks!  Arun    Endometrial Biopsy  DXA  Ultrasound

## 2020-10-29 DIAGNOSIS — Z00.6 RESEARCH STUDY PATIENT: Primary | ICD-10-CM

## 2020-10-29 DIAGNOSIS — Z00.6 RESEARCH STUDY PATIENT: ICD-10-CM

## 2020-10-30 ENCOUNTER — HOSPITAL ENCOUNTER (OUTPATIENT)
Dept: RADIOLOGY | Facility: OTHER | Age: 35
Discharge: HOME | End: 2020-10-30
Attending: OBSTETRICS & GYNECOLOGY
Payer: COMMERCIAL

## 2020-10-30 ENCOUNTER — RESEARCH ENCOUNTER (OUTPATIENT)
Dept: RESEARCH | Facility: OTHER | Age: 35
End: 2020-10-30

## 2020-10-30 DIAGNOSIS — Z00.6 RESEARCH STUDY PATIENT: ICD-10-CM

## 2020-10-30 PROCEDURE — 77080 DXA BONE DENSITY AXIAL: CPT | Mod: 26,,, | Performed by: RADIOLOGY

## 2020-10-30 PROCEDURE — 77080 DEXA BONE DENSITY SPINE HIP: ICD-10-PCS | Mod: 26,,, | Performed by: RADIOLOGY

## 2020-10-30 PROCEDURE — 77080 DXA BONE DENSITY AXIAL: CPT | Mod: TC

## 2020-10-30 NOTE — RESEARCH
"Inform Consent Form ICF)  Re-Consent Documentation: Before any activities or procedures were conducted, BUSTER Mccarthy met with Alden Hanna on 20-NOV-2019 @ 4:30pm to re-consent Subject.  BUSTER Mccarthy reviewed amended consent for AbbVie Study  (IRB #: 2017.414 approved 12-Nov-2019) with Subject.  Ample time was given for Subject to review, ask further questions before signing consent.  The Subject verbally agreed to continue her participation in the the study and signed Informed Consent (IRB #: 2017.414 approved 11/12/2019).  A copy of signed consent was given to Subject and uploaded into Epic.      Date:  30-OCT-2020 @ 9:30am  Study: Phase 3b Study to Evaluate the Long-Term Safety and Efficacy of Elagolix in Combination with Estradiol/Norethindrone Acetate for the Management of Heavy Menstrual Bleeding Associated with Uterine Fibroids in Premenopausal Women.           Sponsor:  [employer]   IRB/Protocol #: 2017.414 (Approved 01/22/2019)  : Lisa Stewart MD  Sub-Investigators:  Magnus Roa MD and Ant Briscoe MD  Site Number: 767   Visit:  Month 12 Treatment Study Visit           Complete Physical Examination:  Height (H)- 5'4"  Weight (W)- 100.70kg    Gynecological (External Genitalia, Pelvic and Breast) Examination:  Not conducted during this visit.  PI or SI available.  Will be conducted when Subject returns to have her Endometrial Biopsy performed by Dr. Lisa Stewart.  Subject does not want other SI (Dr. Ant Briscoe) to perform Endometrial Biopsy.    Month 12 Treatment Vital Signs taken on 30-OCT-2020 at 09:51am:              Systolic blood pressure:  138 mm Hg               Diastolic blood pressure:  95 mm Hg              Pulse:  86 beats/min              Respiratory Rate:  18 (breaths/min)             Temperature:  97.3.F              Weight:  100.70kg              Height: 5'4"    Endometrial Biopsy:  Not performed today.  Subject will return to see Dr. Gonzalez " Terry who will perform Endometrial Biopsy.  BUSTER Velardeden will notify Subject when Endometrial Biopsy is scheduled.    Pelvic Ultrasound: TAU, TVU- Not performed today.  Sonographer was not available to perform today.  Subject has requested for Ultrasound to be performed next Friday, 06-NOV-2020.  BUSTER Mccarthy will schedule appointment with Sonographer and notify Subject of scheduled date and time.    DXA:  After completion of Month 12 study visit, Subject reported to Imaging for DXA.  Appointment scheduled for 11:20am.    Clinical Safety Labs:  Chemistry, Hematology, Lipid Panel and Urinalysis:  Chemistry Panel Subset, Lip Panel; Hematology, ELAB//COLLINS PK Plasma and E2 PD samples were collected at 10:09am.  Urine sample was collected at 10:18am for urinalysis.  Samples were collected, processed, packaged and shipped per Lab Manual.  [facility] was called to  samples.  Tracking #:  [id number]    Confirmation #:  [id number]      Urine Pregnancy Test:  [facility] Pregnancy Urine Text conducted at 10:18am.  Expiration April 2021   Negative result.     Contraception Counseling/Dispense Contraceptives as Necessary:  Contraception counseling was conducted.  There has not been a change/new method since last visit.  Subject is not required to use dual contraception methods since she practices total abstinence from sexual intercourse, as the preferred lifestyle of the subject.  Subject was reminded that periodic abstinence is not acceptable.    Subject to Complete Birth Control Attestation:  Since subject is practicing total abstinence she is not required to sign attestation, unless her sexual activity status changes during her participation in this study.    UFS-QoL:   BUSTER Velardeden handed Subject ePRO tablet to complete UFS-QoL questionnaire electronically.    PGIC-MB:  Subject completed PGIC-MB after completing UFS-QoL.    C-SSRS- Since Last Visit:  BUSTER Mccarthy completed C-SSRS on ePRO.    [place] in IRT:   BUSTER Mccarthy entered Month 12 visit in [facility].       Study Drug Administration / Accountability:  Subject was dispensed the following study drug kits below:     Subject Dispense Kit Month 15     Visit ID Dispensing Date Kit Desc Lot ID Kit ID B/D Qty         Month 12   (Open Label) 30-OCT-2020 Year 2 Open label Elagolix 300mg BID   [id number] D 1         Month 12  (Open Label) 30-OCT-2020  Year 2 Open label Estradiol / Norethindrone Acetate 1.0 mg/0.5mg  [id number] D 1                          Study Drug Accountability:  Subject returned all study drug kits that were dispensed on 30-JUL-2020.  Refer to Subject Dispense/Return Report for Site 767 for study drug accountability.                             Subject Dispense Kit            Visit ID Dispensing Date Kit Desc Lot ID Info Kit ID B/D Qty       Month 9 30-Jul-2020       [id number] D 1       Month 9 30-Jul-2020       [id number] D 1       Month 9 30-Jul-2020       [id number] D 1       Month 9 30-Jul-2020       [id number] D 1       Month 9 30-Jul-2020       [id number] D 1       Month 9 30-Jul-2020       [id number] D 1                        Adverse Event Monitoring:  Subject did not report any new AE's or symptoms during this study visit.       Assessment Review/Update Concomitant Medications:  BUSTER Mccarthy reviewed/updated concomitant mediations with subject.  There were no updates.     After questionnaires were completed and subject received IP, BUSTER Mccarthy asked Subject if she had any questions or concerns.  Subject reported that she did not have any questions or concerns.  Subject was reminded to take her medications as prescribed daily. Subject was given 4 pregnancy tests (at home) for her telephone study visits.  BUSTER Mccarthy reminded Subject of her reminding Month 12 study visits.  · Ultrasound  · Endometrial Biopsy  · Gynecological Exam       Next scheduled visits:    Month 13 - Telephone Visit:  Minus 4 Days: 11/13/2020  Target Date:  11/17/2020   Plus 4 Days:  11/21/2020  Month 14 - Telephone Visit:  Minus 4 Days: 12/11/2020  Target Date:  12/15/2020   Plus 4 Days: 12/19/2020  Month 15 -  In Person Visit:  Minus 4 Days:  01/08/2021  Target Date:  01/12/2021   Plus 4 Days: 01/16/2021     $50 for Treatment Month 12 will be placed on subject's ClinCard automatically by [employer].  $50 will be placed will be placed on Subject's Clincard for DXA once kyler received images.

## 2020-11-06 ENCOUNTER — RESEARCH ENCOUNTER (OUTPATIENT)
Dept: RESEARCH | Facility: OTHER | Age: 35
End: 2020-11-06

## 2020-11-20 ENCOUNTER — TELEPHONE (OUTPATIENT)
Dept: RESEARCH | Facility: OTHER | Age: 35
End: 2020-11-20

## 2020-11-20 NOTE — TELEPHONE ENCOUNTER
Date:  20-NOV-2020 @ 11:15am  Study: Phase 3b Study to Evaluate the Long-Term Safety and Efficacy of Elagolix in Combination with Estradiol/Norethindrone Acetate for the Management of Heavy Menstrual Bleeding Associated with Uterine Fibroids in Premenopausal Women.           Sponsor:  [employer]   IRB/Protocol #: 2017.414 (Approved 01/22/2019)  : Lisa Stewart MD  Sub-Investigators:  Magnus Roa MD and Ant Briscoe MD  Site Number: 767   Visit:  Treatment Month 13 Telephone Study Visit      is a Phase 3b Study to Evaluate the Long-Term Safety and Efficacy of Elagolix in Combination with Estradiol/Norethindrone Acetate for the Management of Heavy Menstrual Bleeding Associated with Uterine Fibroids in Premenopausal Women.        Inform Consent Form ICF) Re-Consent Documentation: Before any activities or procedures were conducted, BUSTER Mccarthy met with Alden Hanna on 20-NOV-2019 @ 4:30pm to re-consent Subject.  BUSTER Mccarthy reviewed amended consent for [employer]ie Study  (IRB #: 2017.414 approved 12-Nov-2019) with Subject.  Ample time was given for Subject to review, ask further questions before signing consent.  The Subject verbally agreed to continue her participation in the the study and signed Informed Consent (IRB #: 2017.414 approved 11/12/2019).  A copy of signed consent was given to Subject and uploaded into Epic.          Assessment for  Treatment Month 13 Telephone Contact Summary:     Subject contacted BUSTER Mccarthy to conduct Treatment Month 13 Phone Visit.    Subject was updated with the latest COVID-19 patient visit from the Sponsor and [employer]. Subject was reminded to continue taking her IP as instructed.  Subject was instructed to note the date and time of her 4th to last dose, 3rd, 2nd and last of IP.  Subject's next scheduled Month 14 will be a Phone Study Visit.     Remind Subject to start collecting sanitary products:  Per protocol Amendment 3 approved  on 12 NOV 2019, Subject is no longer required to collect sanitary products..       Treatment Month 13 Subject Self-Administered Home Urine Pregnancy Test:  Pregnancy test performed by Subject at home @ 8:00am    Result: Negative.    SAS Pregnancy Urine Test       Treatment Month 13 Contraception Counseling/Dispensing:    Contraception counseling was performed.  There has not been a change/new method since last visit. Subject was reminded that she can not become pregnant while participating in the study. Subject expressed understanding.     Treatment Month 13 Concomitant Medications:   Concomitant medications were assessed.  Subject did not take any over the counter or prescribed medications since her last in-person Month 12 study visit on 30-OCT-2020.        Treatment Month 13 Adverse Event Monitoring:  Subject did not report any adverse events during this Month 13 telephone study visit.       Subject's stipend of $25 for Treatment Month 13 Telephone Visit will be automatically generated on her ClinCard.       Subject's Treatment Month 13 Telephone Visit will be documented in Rave and OnCore.  Subject was reminded that her Treatment Month 14 will be a phone visit.         Upcoming Month 14 Telephone Scheduled Visit:  Target Date: 12/15/2020  Minus 4 days: 12/11/2020  Plus 4 days: 12/19/2020     Subject was also reminded that [facility] will call in advance to schedule appointment and/or give her a status update regarding her next Month 14 telephone study visit.     Subject visit completed. She denies any questions or concerns at this time.  Phone Contact Visit ended at 11:50.       Subject's stipend of $25 for Year 1 Treatment Month 13 Phone Visit will be automatically generated on her ClinCard.

## 2020-12-22 ENCOUNTER — TELEPHONE (OUTPATIENT)
Dept: RESEARCH | Facility: OTHER | Age: 35
End: 2020-12-22

## 2020-12-22 NOTE — TELEPHONE ENCOUNTER
Date:  19-DEC-2020 @ 2:05pm  Study: Phase 3b Study to Evaluate the Long-Term Safety and Efficacy of Elagolix in Combination with Estradiol/Norethindrone Acetate for the Management of Heavy Menstrual Bleeding Associated with Uterine Fibroids in Premenopausal Women.           Sponsor:  [employer]   IRB/Protocol #: 2017.414 (Approved 01/22/2019)  : Lisa Stewart MD  Sub-Investigators:  Magnus Roa MD and Ant Briscoe MD  Site Number: 767   Visit:  Treatment Month 14 Telephone Study Visit      is a Phase 3b Study to Evaluate the Long-Term Safety and Efficacy of Elagolix in Combination with Estradiol/Norethindrone Acetate for the Management of Heavy Menstrual Bleeding Associated with Uterine Fibroids in Premenopausal Women.        Inform Consent Form ICF) Re-Consent Documentation: Before any activities or procedures were conducted, BUSTER Mccarthy met with Alden Hanna on 20-NOV-2019 @ 4:30pm to re-consent Subject.  BUSTER Mccarthy reviewed amended consent for [employer]ie Study  (IRB #: 2017.414 approved 12-Nov-2019) with Subject.  Ample time was given for Subject to review, ask further questions before signing consent.  The Subject verbally agreed to continue her participation in the the study and signed Informed Consent (IRB #: 2017.414 approved 11/12/2019).  A copy of signed consent was given to Subject and uploaded into Epic.          Assessment for  Treatment Month 14 Telephone Contact Summary:     Subject contacted BUSTER Mccarthy to conduct Treatment Month 14 Phone Visit.     Subject was reminded to continue taking her IP as instructed.  Subject was instructed to note the date and time of her 4th to last dose, 3rd, 2nd and last of IP.  Subject's next scheduled Month 15 will be an in-clnic  Study Visit.     Remind Subject to start collecting sanitary products:  Per protocol Amendment 3 approved on 12 NOV 2019, Subject is no longer required to collect sanitary products..        Treatment Month 14 Subject Self-Administered Home Urine Pregnancy Test:  Pregnancy test performed by Subject at home @ 11:00am    Result: Negative.    SAS Pregnancy Urine Test       Treatment Month 14 Contraception Counseling/Dispensing:    Contraception counseling was performed.  There has not been a change/new method since last visit. Subject was reminded that she can not become pregnant while participating in the study. Subject expressed understanding.     Treatment Month 14 Concomitant Medications:   Concomitant medications were assessed.  Subject did not take any over the counter or prescribed medications since her last Month 13 Telephone Visit on 20-NOV-2020.        Treatment Month 14 Adverse Event Monitoring:  Subject did not report any adverse events during this Month 14 telephone study visit.       Subject's stipend of $25 for Treatment Month 14 Telephone Visit will be automatically generated on her ClinCard.       Subject's Treatment Month 14 Telephone Visit will be documented in [facility].  Subject was reminded that her Treatment Month 15 will be an in-person study visit.         Upcoming Month 15 Telephone Scheduled Visit:  Target Date: 01/12/2021  Minus 4 days: 01/08/2021  Plus 4 days: 01/16/2021     Subject was also reminded that BUSTER Mccarthy will call in advance to schedule appointment and/or give her a status update regarding her next Month 14 telephone study visit.     Subject visit completed. She denies any questions or concerns at this time.  Phone Contact Visit ended at 2:30pm.       Subject's stipend of $25 for Year 1 Treatment Month 14 Phone Visit will be automatically generated on her ClinCard.

## 2021-01-06 ENCOUNTER — OFFICE VISIT (OUTPATIENT)
Dept: DERMATOLOGY | Facility: CLINIC | Age: 36
End: 2021-01-06
Payer: COMMERCIAL

## 2021-01-06 DIAGNOSIS — D48.5 NEOPLASM OF UNCERTAIN BEHAVIOR OF SKIN: Primary | ICD-10-CM

## 2021-01-06 PROCEDURE — 99202 PR OFFICE/OUTPT VISIT, NEW, LEVL II, 15-29 MIN: ICD-10-PCS | Mod: 95,,, | Performed by: DERMATOLOGY

## 2021-01-06 PROCEDURE — 99202 OFFICE O/P NEW SF 15 MIN: CPT | Mod: 95,,, | Performed by: DERMATOLOGY

## 2021-01-14 DIAGNOSIS — Z00.6 RESEARCH STUDY PATIENT: ICD-10-CM

## 2021-01-15 ENCOUNTER — RESEARCH ENCOUNTER (OUTPATIENT)
Dept: RESEARCH | Facility: OTHER | Age: 36
End: 2021-01-15

## 2021-01-20 ENCOUNTER — RESEARCH ENCOUNTER (OUTPATIENT)
Dept: RESEARCH | Facility: OTHER | Age: 36
End: 2021-01-20

## 2021-02-03 ENCOUNTER — OFFICE VISIT (OUTPATIENT)
Dept: DERMATOLOGY | Facility: CLINIC | Age: 36
End: 2021-02-03
Payer: COMMERCIAL

## 2021-02-03 DIAGNOSIS — D48.5 NEOPLASM OF UNCERTAIN BEHAVIOR OF SKIN: Primary | ICD-10-CM

## 2021-02-03 PROCEDURE — 11102 PR TANGENTIAL BIOPSY, SKIN, SINGLE LESION: ICD-10-PCS | Mod: S$GLB,,, | Performed by: DERMATOLOGY

## 2021-02-03 PROCEDURE — 11102 TANGNTL BX SKIN SINGLE LES: CPT | Mod: S$GLB,,, | Performed by: DERMATOLOGY

## 2021-02-03 PROCEDURE — 99999 PR PBB SHADOW E&M-EST. PATIENT-LVL III: ICD-10-PCS | Mod: PBBFAC,,, | Performed by: DERMATOLOGY

## 2021-02-03 PROCEDURE — 99499 NO LOS: ICD-10-PCS | Mod: S$GLB,,, | Performed by: DERMATOLOGY

## 2021-02-03 PROCEDURE — 88305 TISSUE EXAM BY PATHOLOGIST: CPT | Mod: 26,,, | Performed by: PATHOLOGY

## 2021-02-03 PROCEDURE — 88305 TISSUE EXAM BY PATHOLOGIST: CPT | Performed by: PATHOLOGY

## 2021-02-03 PROCEDURE — 99999 PR PBB SHADOW E&M-EST. PATIENT-LVL III: CPT | Mod: PBBFAC,,, | Performed by: DERMATOLOGY

## 2021-02-03 PROCEDURE — 99499 UNLISTED E&M SERVICE: CPT | Mod: S$GLB,,, | Performed by: DERMATOLOGY

## 2021-02-03 PROCEDURE — 88305 TISSUE EXAM BY PATHOLOGIST: ICD-10-PCS | Mod: 26,,, | Performed by: PATHOLOGY

## 2021-02-03 RX ORDER — PENICILLIN V POTASSIUM 500 MG/1
TABLET, FILM COATED ORAL
COMMUNITY
Start: 2020-11-12 | End: 2022-03-03

## 2021-02-03 RX ORDER — CHLORHEXIDINE GLUCONATE ORAL RINSE 1.2 MG/ML
SOLUTION DENTAL
COMMUNITY
Start: 2020-11-07 | End: 2022-03-03

## 2021-02-03 RX ORDER — DOXYCYCLINE HYCLATE 100 MG
TABLET ORAL
COMMUNITY
Start: 2020-11-07 | End: 2022-03-03

## 2021-02-05 LAB
FINAL PATHOLOGIC DIAGNOSIS: NORMAL
GROSS: NORMAL

## 2021-02-13 ENCOUNTER — TELEPHONE (OUTPATIENT)
Dept: RESEARCH | Facility: OTHER | Age: 36
End: 2021-02-13

## 2021-03-13 ENCOUNTER — TELEPHONE (OUTPATIENT)
Dept: RESEARCH | Facility: OTHER | Age: 36
End: 2021-03-13

## 2021-03-30 DIAGNOSIS — Z00.6 RESEARCH STUDY PATIENT: Primary | ICD-10-CM

## 2021-04-05 DIAGNOSIS — Z00.6 RESEARCH STUDY PATIENT: ICD-10-CM

## 2021-04-06 ENCOUNTER — RESEARCH ENCOUNTER (OUTPATIENT)
Dept: RESEARCH | Facility: OTHER | Age: 36
End: 2021-04-06

## 2021-04-06 ENCOUNTER — HOSPITAL ENCOUNTER (OUTPATIENT)
Dept: RADIOLOGY | Facility: OTHER | Age: 36
Discharge: HOME | End: 2021-04-06
Attending: OBSTETRICS & GYNECOLOGY
Payer: COMMERCIAL

## 2021-04-06 DIAGNOSIS — Z00.6 RESEARCH STUDY PATIENT: ICD-10-CM

## 2021-04-06 PROCEDURE — 77080 DXA BONE DENSITY AXIAL: CPT | Mod: TC

## 2021-04-06 PROCEDURE — 77080 DXA BONE DENSITY AXIAL: CPT | Mod: 26,,, | Performed by: RADIOLOGY

## 2021-04-06 PROCEDURE — 77080 DEXA BONE DENSITY SPINE HIP: ICD-10-PCS | Mod: 26,,, | Performed by: RADIOLOGY

## 2021-04-08 ENCOUNTER — RESEARCH ENCOUNTER (OUTPATIENT)
Dept: RESEARCH | Facility: OTHER | Age: 36
End: 2021-04-08

## 2021-05-05 ENCOUNTER — DOCUMENTATION ONLY (OUTPATIENT)
Dept: RESEARCH | Facility: OTHER | Age: 36
End: 2021-05-05

## 2021-05-08 ENCOUNTER — TELEPHONE (OUTPATIENT)
Dept: RESEARCH | Facility: OTHER | Age: 36
End: 2021-05-08

## 2021-05-10 ENCOUNTER — TELEPHONE (OUTPATIENT)
Dept: OBSTETRICS AND GYNECOLOGY | Facility: CLINIC | Age: 36
End: 2021-05-10

## 2021-06-04 ENCOUNTER — TELEPHONE (OUTPATIENT)
Dept: RESEARCH | Facility: OTHER | Age: 36
End: 2021-06-04

## 2021-06-04 ENCOUNTER — TELEPHONE (OUTPATIENT)
Dept: OBSTETRICS AND GYNECOLOGY | Facility: CLINIC | Age: 36
End: 2021-06-04

## 2021-06-04 DIAGNOSIS — D25.1 FIBROIDS, INTRAMURAL: Primary | ICD-10-CM

## 2021-06-07 ENCOUNTER — TELEPHONE (OUTPATIENT)
Dept: OBSTETRICS AND GYNECOLOGY | Facility: CLINIC | Age: 36
End: 2021-06-07

## 2021-06-23 DIAGNOSIS — Z00.6 RESEARCH STUDY PATIENT: ICD-10-CM

## 2021-06-29 ENCOUNTER — RESEARCH ENCOUNTER (OUTPATIENT)
Dept: RESEARCH | Facility: OTHER | Age: 36
End: 2021-06-29

## 2021-06-29 ENCOUNTER — HOSPITAL ENCOUNTER (OUTPATIENT)
Dept: RADIOLOGY | Facility: OTHER | Age: 36
Discharge: HOME | End: 2021-06-29
Attending: OBSTETRICS & GYNECOLOGY
Payer: COMMERCIAL

## 2021-06-29 DIAGNOSIS — D25.1 FIBROIDS, INTRAMURAL: ICD-10-CM

## 2021-06-29 PROCEDURE — 76830 TRANSVAGINAL US NON-OB: CPT | Mod: 26,,, | Performed by: RADIOLOGY

## 2021-06-29 PROCEDURE — 76856 US EXAM PELVIC COMPLETE: CPT | Mod: 26,,, | Performed by: RADIOLOGY

## 2021-06-29 PROCEDURE — 76856 US PELVIS COMP WITH TRANSVAG NON-OB (XPD): ICD-10-PCS | Mod: 26,,, | Performed by: RADIOLOGY

## 2021-06-29 PROCEDURE — 76830 US PELVIS COMP WITH TRANSVAG NON-OB (XPD): ICD-10-PCS | Mod: 26,,, | Performed by: RADIOLOGY

## 2021-06-29 PROCEDURE — 76856 US EXAM PELVIC COMPLETE: CPT | Mod: TC

## 2021-07-30 ENCOUNTER — TELEPHONE (OUTPATIENT)
Dept: RESEARCH | Facility: OTHER | Age: 36
End: 2021-07-30

## 2021-08-28 ENCOUNTER — TELEPHONE (OUTPATIENT)
Dept: RESEARCH | Facility: OTHER | Age: 36
End: 2021-08-28

## 2021-09-27 DIAGNOSIS — Z00.6 RESEARCH STUDY PATIENT: Primary | ICD-10-CM

## 2021-09-29 DIAGNOSIS — Z00.6 RESEARCH STUDY PATIENT: ICD-10-CM

## 2021-09-30 ENCOUNTER — PROCEDURE VISIT (OUTPATIENT)
Dept: OBSTETRICS AND GYNECOLOGY | Facility: CLINIC | Age: 36
End: 2021-09-30
Attending: OBSTETRICS & GYNECOLOGY

## 2021-09-30 ENCOUNTER — HOSPITAL ENCOUNTER (OUTPATIENT)
Dept: RADIOLOGY | Facility: OTHER | Age: 36
Discharge: HOME | End: 2021-09-30
Attending: OBSTETRICS & GYNECOLOGY

## 2021-09-30 ENCOUNTER — RESEARCH ENCOUNTER (OUTPATIENT)
Dept: RESEARCH | Facility: OTHER | Age: 36
End: 2021-09-30

## 2021-09-30 DIAGNOSIS — Z00.6 RESEARCH STUDY PATIENT: ICD-10-CM

## 2021-09-30 PROCEDURE — 77080 DXA BONE DENSITY AXIAL: CPT | Mod: TC

## 2021-09-30 PROCEDURE — 77080 DEXA BONE DENSITY SPINE HIP: ICD-10-PCS | Mod: 26,,, | Performed by: RADIOLOGY

## 2021-09-30 PROCEDURE — 77080 DXA BONE DENSITY AXIAL: CPT | Mod: 26,,, | Performed by: RADIOLOGY

## 2021-10-01 ENCOUNTER — OFFICE VISIT (OUTPATIENT)
Dept: OBSTETRICS AND GYNECOLOGY | Facility: CLINIC | Age: 36
End: 2021-10-01

## 2021-10-01 VITALS
DIASTOLIC BLOOD PRESSURE: 80 MMHG | HEIGHT: 64 IN | WEIGHT: 229.25 LBS | RESPIRATION RATE: 18 BRPM | BODY MASS INDEX: 39.14 KG/M2 | SYSTOLIC BLOOD PRESSURE: 120 MMHG | TEMPERATURE: 99 F

## 2021-10-01 DIAGNOSIS — D25.1 INTRAMURAL, SUBMUCOUS, AND SUBSEROUS LEIOMYOMA OF UTERUS: ICD-10-CM

## 2021-10-01 DIAGNOSIS — D25.0 INTRAMURAL, SUBMUCOUS, AND SUBSEROUS LEIOMYOMA OF UTERUS: ICD-10-CM

## 2021-10-01 DIAGNOSIS — Z00.6 RESEARCH STUDY PATIENT: Primary | ICD-10-CM

## 2021-10-01 DIAGNOSIS — D25.2 INTRAMURAL, SUBMUCOUS, AND SUBSEROUS LEIOMYOMA OF UTERUS: ICD-10-CM

## 2021-10-01 PROCEDURE — 99499 UNLISTED E&M SERVICE: CPT | Mod: S$PBB,,, | Performed by: OBSTETRICS & GYNECOLOGY

## 2021-10-01 PROCEDURE — 99213 OFFICE O/P EST LOW 20 MIN: CPT | Mod: PBBFAC,PO | Performed by: OBSTETRICS & GYNECOLOGY

## 2021-10-01 PROCEDURE — 99999 PR PBB SHADOW E&M-EST. PATIENT-LVL III: CPT | Mod: PBBFAC,,, | Performed by: OBSTETRICS & GYNECOLOGY

## 2021-10-01 PROCEDURE — 99499 NO LOS: ICD-10-PCS | Mod: S$PBB,,, | Performed by: OBSTETRICS & GYNECOLOGY

## 2021-10-01 PROCEDURE — 99999 PR PBB SHADOW E&M-EST. PATIENT-LVL III: ICD-10-PCS | Mod: PBBFAC,,, | Performed by: OBSTETRICS & GYNECOLOGY

## 2021-10-22 ENCOUNTER — TELEPHONE (OUTPATIENT)
Dept: RESEARCH | Facility: OTHER | Age: 36
End: 2021-10-22

## 2021-11-20 ENCOUNTER — TELEPHONE (OUTPATIENT)
Dept: RESEARCH | Facility: OTHER | Age: 36
End: 2021-11-20

## 2022-01-07 ENCOUNTER — TELEPHONE (OUTPATIENT)
Dept: RESEARCH | Facility: OTHER | Age: 37
End: 2022-01-07

## 2022-01-07 NOTE — TELEPHONE ENCOUNTER
[employer]  is a Phase 3b Study to Evaluate the Long-Term Safety and Efficacy of Elagolix in Combination with Estradiol/Norethindrone Acetate for the Management of Heavy Menstrual Bleeding Associated with Uterine Fibroids in Premenopausal Women.       Research participant contacted  to inform that she tested positive for COVID on today and would not be able to attend her Month 27 study visit until the week of January 17th.      Study Visit Month 27 should have been completed 12/10/2021 through 12/18/2021.  Research participant was contacted on 12/13/2021 to schedule Month 27 visit. Research participant stated that she was unavailable and was leaving to go out of town on the 18th of December for [place] and would not return to [place] until January 3, 2022.      I informed Research participant to take care of herself and to contact me to schedule her Month 27 when she is symptom free and has a COVID test stating that she is negative or I would contact her on the 17th of January.    She stated she understood and had no further questions.

## 2022-01-31 ENCOUNTER — RESEARCH ENCOUNTER (OUTPATIENT)
Dept: RESEARCH | Facility: OTHER | Age: 37
End: 2022-01-31

## 2022-01-31 DIAGNOSIS — Z00.6 RESEARCH STUDY PATIENT: Primary | ICD-10-CM

## 2022-01-31 DIAGNOSIS — Z00.6 RESEARCH STUDY PATIENT: ICD-10-CM

## 2022-01-31 NOTE — PROGRESS NOTES
Inform Consent Form ICF)  Re-Consent Documentation:  Before any activities or procedures were conducted, BUSTER Mccarthy met with Alden Hanna (Subject # [id number]) on 31-JAN-2022 @ 3:00pm to re-consent Subject.  BUSTER Mccarthy reviewed amended consent (Amendment 4) for [employer] Study  (IRB #: 2017.414 approved 12-JAN-2022) with Subject.  Ample time was given for Subject to review and ask further questions before signing consent.  The Subject verbally agreed to continue her participation in the the study and signed Informed Consent (IRB #: 2017.414 approved 01/12/2022).  A copy of signed consent was given to Subject and uploaded into Epic.      Date:  31-JAN-2022 @ 3:00pm  Study: Phase 3b Study to Evaluate the Long-Term Safety and Efficacy of Elagolix in Combination with Estradiol/Norethindrone Acetate for the Management of Heavy Menstrual Bleeding Associated with Uterine Fibroids in Premenopausal Women.           Sponsor:  [employer]   IRB/Protocol #: 2017.414 (Approved 03/09/2021)  : Lisa Stewart MD  Sub-Investigators:  Magnus Roa MD and Ant Briscoe MD  Site Number: [id number]   Visit:  Year 3 Month 27 Treatment Study Visit     NOTE:  This visit is out of window due to Subject not being in town during [place] and [place] and also testing positive for COVID-19 in January.  Subject had an appointment scheduled on 28-JAN-2022 but rescheduled it to 31-JAN-2022.     ___________________________________________________________________________     After re-consenting was completed the following Month 27 procedures/assessments were conducted:    Year 3 Month 27 Treatment Vital Signs taken on 31-JAN-2022 at 3:33pm             Systolic blood pressure:  144 mm Hg               Diastolic blood pressure:  95 mm Hg              Pulse:  96 beats/min              Respiratory Rate:  16 (breaths/min)             Temperature:  98.7F              Weight:  104.70  kg              Height:         Clinical Safety Labs:      Note:  There were no Month 27 study kits on site in inventory.  I was informed by [facility] Representative to use the Month 36 kit and change the study visit to Month 27 and in lower left hand corner of requisition to strike out 17 and put No NCR 15.  The [facility] Rep confirmed that I would only collect the Chemistry panel.   The kit used to collect Month 27Chemistry Panel Subset, Lipid Panel samples were collected at 15:46pm.    Samples were collected and processed.  However, due to Subject's late arrival, the Chemistry panel could not be shipped.  The sample is an ambient collection and was stored per protocol on 31-JAN-2022 @ 4:46pm.  Sample will be packaged and shipped per Lab Manual on 01-FEB-2022.      [facility] called on 01-FEB-2022 to  samples.  Tracking #: [id number]   Confirmation #:  [id number]     Urine Pregnancy Test:  Urine pregnancy test was administered before IP was dispensed.  [facility] Pregnancy Urine. Result:  Negative    Lot #: [id number]   Exp. Date:  FEB2022     Contraception Counseling/Dispense Contraceptives as Necessary:  Contraception counseling was conducted.  There has not been a change/new method since last visit.  Subject is not required to use dual contraception methods since she practices total abstinence from sexual intercourse, as the preferred lifestyle of the subject. Subject was reminded that periodic abstinence is not acceptable.     Birth Control Attestation:  Since subject is practicing total abstinence she is not required to sign attestation, unless her sexual activity status changes during her participation in this study.     C-SSRS- Since Last Visit:  BUSTER Mccarthy completed C-SSRS on ePRO.     [place] in IRT:  BUSTER Mccarthy entered Month 27 study visit in [facility].       Study Drug Administration / Accountability:  Subject was dispensed the following study drug kits below:                                                     Subject Dispense Kit Month 27     Visit ID Dispensing Date Kit Desc Lot ID Info Kit ID B/D Qty     Month 27 (Open Label) 31-JAN-2022 Year 2 Open label Estradiol / Norethindrone Acetate 1.0mg/0.5mg  [id number]    [id number] D 1     Month 27 (Open Label) 31-JAN-2022 Year 2 Open label Elagolix 300mg BID [id number]    [id number] D 1                                 Study Drug Accountability:  Subject returned the following IP Month 24 study drug kits.                  Refer to Subject Dispense/Return Report for Site 76 for study drug accountability.                     Subject Dispense Kit Month 24     Visit ID Dispensing Date Kit Desc Lot ID Kit ID B/D Qty   Month 24   (Open Label) 30-SEP-2021 Year 2 Open label Elagolix 300mg BID      [id number] D 1         Month 24  (Open Label)          30-SEP-2021 Year 2 Open label Estradiol / Norenthindrone Acetate 1.0 mg/0.5mg [id number] D 1                PK (Last 4) Dosing Dates & Times:  PK Dosing Dates & Times will be entered in EDC if information is requested.         Adverse Event Monitoring:  Subject reported that her right breast has gotten noticeably larger than the left.  She noticed this on 15-NOV-2022.  She is only reporting her right breast has gotten larger, but reports not having in pain.   The AE event was noted   on the AE Log and Dr. Stewart will be notified of AE and informed that a visit will need to be scheduled to address AE.     Assessment Review/Update Concomitant Medications:  BUSTER Mccarthy reviewed/updated concomitant medications with subject.  There were no updates.     Subject was reminded of the next scheduled upcoming visits:     Subject will be contacted with upcoming visits due to she being out of window for Month 27.  Month 28 - Telephone Visit:  Minus 4 Days: 07/31/2021  Target Date:  08/04/2021   Plus 4 Days: 08/08/2021  Month 29 - Telephone Visit:  Minus 4 Days: 08/28/2021  Target Date:  09/01/2021   Plus  "4 Days: 09/05/2021  Month 30 DXA -  Minus 15 Days:  09/14/2021  Target Date:  09/29/2021   Plus 4 Days: 10/03/2021  Month 30 - In-Person Study Visit:  Minus 4 Days: 09/25/2021  Target Date:  09/29/2021   Plus 4 Days: 10/03/2021     After  completing Month 27 study visit, BUSTER Mccarthy asked subject if she had any questions, comments and/or concerns.  Subject stated "NO".   I reminded Subject that I will contact her for her upcoming Month 28 phone visit.   $75 for Treatment Month 27 will be placed on subject's ClinCard automatically by Tier [employer].         "

## 2022-02-10 ENCOUNTER — TELEPHONE (OUTPATIENT)
Dept: RESEARCH | Facility: OTHER | Age: 37
End: 2022-02-10

## 2022-02-10 ENCOUNTER — PATIENT MESSAGE (OUTPATIENT)
Dept: OBSTETRICS AND GYNECOLOGY | Facility: CLINIC | Age: 37
End: 2022-02-10

## 2022-02-10 NOTE — TELEPHONE ENCOUNTER
Date:  10-FEB-2022 @ 4:45pm  Study: Phase 3b Study to Evaluate the Long-Term Safety and Efficacy of Elagolix in Combination with Estradiol/Norethindrone Acetate for the Management of Heavy Menstrual Bleeding Associated with Uterine Fibroids in Premenopausal Women.           Sponsor:  [email or web address]   IRB/Protocol #: 2017.414 (Approved 01/12/2022)  : Lisa Stewart MD  Sub-Investigators:  Magnus Roa MD and Ant Briscoe MD  Site Number: [id number]   Subject #: [id number]  Visit:  Treatment Month 28 Telephone Study Visit      is a Phase 3b Study to Evaluate the Long-Term Safety and Efficacy of Elagolix in Combination with Estradiol/Norethindrone Acetate for the Management of Heavy Menstrual Bleeding Associated with Uterine Fibroids in Premenopausal Women.           Assessment for  Treatment Month 28 Telephone Contact Summary:     Research participant contacted [facility] to conduct Treatment Month 28 Phone Visit.     Inform Consent Form ICF)  Re-Consent Documentation:  Alden Hanna was re-consented on 31-JAN-2022 (IRB #: 2017.414 approved 01/12/2022).       Treatment Month 28 Subject Self-Administered Home Urine Pregnancy Test:  Pregnancy test performed by Subject at home on 10-FEB-2022 @ 4:30pm    Result: Negative.    [facility] Pregnancy Urine   Lot No.: [id number]  Exp. Date:  2022-APR      Treatment Month 28 Contraception Counseling/Dispensing:    Contraception counseling was performed.  There has not been a change/new method since last visit. Subject was reminded that she can not become pregnant while participating in the study. Subject expressed understanding.     Treatment Month 28 Concomitant Medications:   Concomitant medications were assessed.  Subject did not take any over the counter or prescribed medications. In addition, there as not been any changes to her current concomitant medications since her last Month 27 in-person visit on 31-JAN-2022.        Treatment Month 28 Adverse Event  "Monitoring:  It should be noted that subject is not reporting any adverse events for Month 28 Phone Visit.      NOTE:  Subject reported that Dr. Terry Almeida's nurse called to schedule an Unscheduled Visit to address her Adverse Event (Right Breast Larger) reported during Month 27 Study Visit on 31-JAN-2022.        Subject's Month 28 Telephone Study Visit will be documented in Rave and OnCore.       Subject [id number] Upcoming Month 29 & Month 30 with DXA Study Visits:     Ms. Hanna was informed that due to her having COVID-19 in December she is behind on her timeline for scheduled visits.  Therefore, her Month 28 Telephone Visit will be conducted today, and her Month 29 Telephone Visit will be conducted one week from today (Thursday, February 17, 2022 @ 5:00pm).  After her Month 29 is conducted, we will schedule her Month 30 study visit and DXA the week of February 21st.    Ms. Hanna expressed she understood the scheduling of her upcoming visits.    I asked Ms. Hanna did she have any other questions, comments and/or concerns and she stated "NO".     ______________________________________________      Subject's stipend of $25 for Treatment Month 28 Telephone Visit will be automatically generated on her ClinCard.    "

## 2022-02-16 DIAGNOSIS — Z00.6 RESEARCH STUDY PATIENT: Primary | ICD-10-CM

## 2022-02-17 ENCOUNTER — TELEPHONE (OUTPATIENT)
Dept: RESEARCH | Facility: OTHER | Age: 37
End: 2022-02-17

## 2022-02-17 NOTE — TELEPHONE ENCOUNTER
Date:  17-FEB-2022 @ 9:00am  Study: Phase 3b Study to Evaluate the Long-Term Safety and Efficacy of Elagolix in Combination with Estradiol/Norethindrone Acetate for the Management of Heavy Menstrual Bleeding Associated with Uterine Fibroids in Premenopausal Women.           Sponsor:  [employer]   IRB/Protocol #: 2017.414 (Approved 01/12/2022)  : Lisa Stewart MD  Sub-Investigators:  Magnus Roa MD and Ant Briscoe MD  Site Number: [id number]   Subject #: [id number]  Visit:  Treatment Month 29 Telephone Study Visit      is a Phase 3b Study to Evaluate the Long-Term Safety and Efficacy of Elagolix in Combination with Estradiol/Norethindrone Acetate for the Management of Heavy Menstrual Bleeding Associated with Uterine Fibroids in Premenopausal Women.           Assessment for  Treatment Month 29 Telephone Contact Summary:     Research participant contacted [facility] to conduct Treatment Month 29 Phone Visit.     Inform Consent Form ICF)  Re-Consent Documentation:  Alden Hanna was re-consented on 31-JAN-2022 (IRB #: 2017.414 approved 01/12/2022).       Treatment Month 29 Subject Self-Administered Home Urine Pregnancy Test:  Pregnancy test performed by Subject at home on 17-FEB-2022 @ 7:30am    Result: Negative.    [facility] Pregnancy Urine   Lot No.: [id number]  Exp. Date:  2022-APR      Treatment Month 29 Contraception Counseling/Dispensing:    Contraception counseling was performed.  There has not been a change/new method since last visit. Subject was reminded that she can not become pregnant while participating in the study. Subject expressed understanding.     Treatment Month 29 Concomitant Medications:   Concomitant medications were assessed.  Subject did not take any over the counter or prescribed medications. In addition, there as not been any changes to her current concomitant medications since her last Month 28 phone Visit on 10-FEB-2022.        Treatment Month 29 Adverse Event  "Monitoring:  It should be noted that subject is not reporting any adverse events for Month 28 Phone Visit.      NOTE:  Subject has Unscheduled Visit with Dr. Lisa Stewart to assess her Adverse Event (Right Breast Larger) reported during Month 27 Study Visit on 31-JAN-2022.        Subject's Month 29 Telephone Study Visit will be documented in Rave and OnCore.       Subject [id number] Upcoming Month 29 & Month 30 with DXA Study Visits:     Month 30 DXA scheduled for 25-FEB-2022 @ 9:00am with Imaging.    Month 30 Study Visit will be conducted directly after completion of her DXA in [place], [address].    Ms. Hanna expressed she understood the scheduling of her upcoming visits.    I asked Ms. Hanna did she have any other questions, comments and/or concerns and she stated "NO".     ______________________________________________      Subject's stipend of $25 for Treatment Month 29 Telephone Visit will be automatically generated on her ClinCard.   "

## 2022-02-25 ENCOUNTER — RESEARCH ENCOUNTER (OUTPATIENT)
Dept: RESEARCH | Facility: OTHER | Age: 37
End: 2022-02-25

## 2022-02-25 ENCOUNTER — HOSPITAL ENCOUNTER (OUTPATIENT)
Dept: RADIOLOGY | Facility: OTHER | Age: 37
Discharge: HOME | End: 2022-02-25
Attending: OBSTETRICS & GYNECOLOGY

## 2022-02-25 DIAGNOSIS — Z00.6 RESEARCH STUDY PATIENT: ICD-10-CM

## 2022-02-25 PROCEDURE — 77080 DXA BONE DENSITY AXIAL: CPT | Mod: TC

## 2022-02-25 PROCEDURE — 77080 DEXA BONE DENSITY SPINE HIP: ICD-10-PCS | Mod: 26,,, | Performed by: RADIOLOGY

## 2022-02-25 PROCEDURE — 77080 DXA BONE DENSITY AXIAL: CPT | Mod: 26,,, | Performed by: RADIOLOGY

## 2022-02-25 NOTE — PROGRESS NOTES
Date:  25-FEB-2022 @ 10:00am  Study: Phase 3b Study to Evaluate the Long-Term Safety and Efficacy of Elagolix in Combination with Estradiol/Norethindrone Acetate for the Management of Heavy Menstrual Bleeding Associated with Uterine Fibroids in Premenopausal Women.           Sponsor:  [employer]   IRB/Protocol #: 2017.414 (Approved 03/09/2021)  : Lisa Stewart MD  Sub-Investigators:  Magnus Roa MD and Ant Briscoe MD  Site Number: [id number]   Visit:  Year 3 Month 30 Treatment Study Visit      NOTE:  Due to upcoming database lock by sponsor, it was requested that this visit be accelerated to meet all timelines.     ___________________________________________________________________________       Year 3 Month 30 Treatment Vital Signs taken on 25-FEB-2022 at 10:14am             Systolic blood pressure:  150 mm Hg               Diastolic blood pressure:  96 mm Hg              Pulse:  72 beats/min              Respiratory Rate:  16 (breaths/min)             Temperature:  98.6F              Weight:  105.3 kg              Height:      DXA Scan:  Month 30 DXA performed in Imaging Department @ 9:00am    Clinical Safety Labs:  The following Samples were collected at 10:45am and processed as per protocol.  Samples will be shipped via [employer]/UPS.    Tracking #: [id number]   Confirmation #:  [id number]   Lipid Panel and Subset of Chemistry Labs:  LANIE, AST, Bilirubin and Alkaline Phosphatase   Biomarker Sample:  Serum Estradiol (E2)   Pharmacokinetic Sample (PK):  Elagolix and COLLINS Plasma Concentration     Urine Pregnancy Test:  Urine pregnancy test was administered before IP was dispensed.  [facility] Pregnancy Urine. Result:  Negative    Lot #: [id number]   Exp. Date:  MAR2022     Contraception Counseling/Dispense Contraceptives as Necessary:  Contraception counseling was conducted.  There has not been a change/new method since last visit.  Subject is not required to use dual contraception  methods since she practices total abstinence from sexual intercourse, as the preferred lifestyle of the subject. Subject was reminded that periodic abstinence is not acceptable.     Birth Control Attestation:  Since subject is practicing total abstinence she is not required to sign attestation, unless her sexual activity status changes during her participation in this study.     C-SSRS- Since Last Visit:  BUSTER Mccarthy completed C-SSRS on ePRO.     [employer] (IRT):  BUSTER Mccarthy entered Month 30 study visit in [facility].       Study Drug Administration / Dispense Study Drug:  Subject was dispensed the following study drug kits below:                                                     Subject Dispense Kit Month 30     Visit ID Dispensing Date Kit Desc Lot ID Info Kit ID B/D Qty     Month 30 (Open Label) 25-FEB-2022 Year 2 Open label Estradiol / Norethindrone Acetate 1.0mg/0.5mg  [id number]    [id number] D 1     Month 30 (Open Label) 25-FEB-2022 Year 2 Open label Elagolix 300mg BID [id number]    [id number] D 1                               Study Drug Accountability:  Subject returned the following IP Month 27 study drug kits. Refer to Subject Dispense/Return Report for Site [facility] for study drug accountability.                     Subject Dispense Kit Month 27     Visit ID Dispensing Date Kit Desc Lot ID Kit ID B/D Qty   Month 27   (Open Label) 31-JAN-2022 Year 2 Open label Elagolix 300mg BID      [id number] D 1         Month 27  (Open Label)          31-JAN-2022 Year 2 Open label Estradiol / Norenthindrone Acetate 1.0 mg/0.5mg [id number] D 1                                             PK (Last 4) Dosing Dates & Times:  Subject reported that she did not note PK Dosing Dates & Times for Month 27 IP dispensed.  NOTE:  DAISY Dubon was sent an email and she responded she will reach out to Monitor DAISY.                                    Adverse Event Monitoring:  Subject reported that she started  "experiencing pain in her toes on 18-FEB-2022.  She noticed that the pain was not due to her wearing flats or transitioning to high heels.  This AE will be added to AE Log and will also be   assessed by Dr. Stewart during her scheduled visit on 03-MAR-2022.  This unscheduled visit with Dr. Stewart is to assess the other AE (right breast) reported during her Month 27 visit.     Assessment Review/Update Concomitant Medications:  BUSTER Mccarthy reviewed/updated concomitant medications with subject.  There were no updates.     Subject was reminded of the next scheduled upcoming visits:      Subject will be contacted with upcoming visits due to request of Sponsor to accelerate visits Months 89, 29 & 30 due to upcoming database.  Month 31 - Telephone Visit:  TBD  Month 32 - Telephone Visit:  TBD  Month 33 - Onsite Visit:  TBD     After  completing Month 30 study visit, BUSTER Mccarthy asked subject if she had any questions, comments and/or concerns.  Subject stated "NO".   I reminded Subject that I will contact her for her upcoming Month 31 phone visit.   $75 for Treatment Month 30 will be placed on subject's ClinCard automatically by [employer]. Once received and reviewed, subject's stipend of $50 will automatically be placed on her clincard.                                                                                                                                                                                                                                                                                                                                                                                                                                                   "

## 2022-03-03 ENCOUNTER — OFFICE VISIT (OUTPATIENT)
Dept: OBSTETRICS AND GYNECOLOGY | Facility: CLINIC | Age: 37
End: 2022-03-03

## 2022-03-03 ENCOUNTER — TELEPHONE (OUTPATIENT)
Dept: OBSTETRICS AND GYNECOLOGY | Facility: CLINIC | Age: 37
End: 2022-03-03

## 2022-03-03 VITALS
WEIGHT: 232.81 LBS | BODY MASS INDEX: 39.75 KG/M2 | HEART RATE: 78 BPM | SYSTOLIC BLOOD PRESSURE: 130 MMHG | HEIGHT: 64 IN | RESPIRATION RATE: 15 BRPM | DIASTOLIC BLOOD PRESSURE: 84 MMHG | TEMPERATURE: 99 F

## 2022-03-03 DIAGNOSIS — Z00.6 RESEARCH STUDY PATIENT: Primary | ICD-10-CM

## 2022-03-03 PROCEDURE — 99499 NO LOS: ICD-10-PCS | Mod: S$PBB,,, | Performed by: OBSTETRICS & GYNECOLOGY

## 2022-03-03 PROCEDURE — 99999 PR PBB SHADOW E&M-EST. PATIENT-LVL III: CPT | Mod: PBBFAC,,, | Performed by: OBSTETRICS & GYNECOLOGY

## 2022-03-03 PROCEDURE — 99213 OFFICE O/P EST LOW 20 MIN: CPT | Mod: PBBFAC,PO | Performed by: OBSTETRICS & GYNECOLOGY

## 2022-03-03 PROCEDURE — 99999 PR PBB SHADOW E&M-EST. PATIENT-LVL III: ICD-10-PCS | Mod: PBBFAC,,, | Performed by: OBSTETRICS & GYNECOLOGY

## 2022-03-03 PROCEDURE — 99499 UNLISTED E&M SERVICE: CPT | Mod: S$PBB,,, | Performed by: OBSTETRICS & GYNECOLOGY

## 2022-03-03 NOTE — TELEPHONE ENCOUNTER
Contacted the patient to make sure that she was arriving for her 9:30am appointment this morning. Patient stated that she was on her way and that her ETA is 10 minutes.

## 2022-03-03 NOTE — PROGRESS NOTES
"CC: Menorrhagia, fibroids    Alden Hanna is a 37 y.o. female  presents for research exam for Phase III Clinical trial for Elagolix.      Patient reports right breast is larger that the left. She also reports pain in her toes.     She denies nipple discharge, no masses.     She reports pain in all toes (less the big toe), both feet, more on the right in the middle day. She reports they feel "crampy".     Patient had COVID-19 in January. She had symptoms 2-3 days before testing positive.     History reviewed. No pertinent past medical history.    History reviewed. No pertinent surgical history.    OB History    Para Term  AB Living   0 0 0 0 0 0   SAB IAB Ectopic Multiple Live Births   0 0 0 0 0       Family History   Problem Relation Age of Onset    Stomach cancer Maternal Grandfather     Breast cancer Neg Hx     Colon cancer Neg Hx     Ovarian cancer Neg Hx        Social History     Tobacco Use    Smoking status: Current Every Day Smoker     Types: Cigars    Smokeless tobacco: Never Used   Substance Use Topics    Alcohol use: Yes     Comment: occasional    Drug use: No         /84 (BP Location: Right arm, Patient Position: Sitting, BP Method: Large (Manual))   Pulse 78   Temp 98.6 °F (37 °C)   Resp 15   Ht 5' 4" (1.626 m)   Wt 105.6 kg (232 lb 12.9 oz)   LMP 2022   BMI 39.96 kg/m²     ROS:  GENERAL: Denies weight gain or weight loss. Feeling well overall.   SKIN: Denies rash or lesions.   HEAD: Denies head injury or headache.   NODES: Denies enlarged lymph nodes.   CHEST: Denies chest pain or shortness of breath.   CARDIOVASCULAR: Denies palpitations or left sided chest pain.   ABDOMEN: No abdominal pain, constipation, diarrhea, nausea, vomiting or rectal bleeding.   URINARY: No frequency, dysuria, hematuria, or burning on urination.  REPRODUCTIVE: See HPI.   BREASTS: The patient performs breast self-examination and denies pain, lumps, or nipple discharge. Right " breast is larger than left.  HEMATOLOGIC: No easy bruisability or excessive bleeding with the exception of menstrual cycles.  MUSCULOSKELETAL: Denies joint pain or swelling. Reports pain in toes.  NEUROLOGIC: Denies syncope or weakness.   PSYCHIATRIC: Denies depression, anxiety or mood swings.    Physical Exam:    APPEARANCE: Well nourished, well developed, in no acute distress.  AFFECT: WNL, alert and oriented x 3  SKIN: No acne or hirsutism  EENT: No abnormalities   NECK: Neck symmetric without masses or thyromegaly  NODES: No inguinal, cervical, axillary, or femoral lymph node enlargement  CARDIOVASCULAR: Regular rate and rhythm  CHEST: Good respiratory effect  ABDOMEN: Soft.  No tenderness or masses.  No hepatosplenomegaly.  No hernias.  BREASTS: Symmetrical, no skin changes or visible lesions.  No palpable masses, nipple discharge bilaterally.  PELVIC: Deferred  EXTREMITIES: No edema.  MUSCULOSKELETAL: Normal strength in upper and lower extremities bilaterally.    NEUROLOGIC: CN II-XII grossly intact.      ASSESSMENT AND PLAN    1. Research study patient         Patient was counseled today on the risks/benefits/alternatives to Phase III Clinical trial.  Continue study as per research protocol.

## 2022-03-25 ENCOUNTER — TELEPHONE (OUTPATIENT)
Dept: RESEARCH | Facility: OTHER | Age: 37
End: 2022-03-25

## 2022-03-25 NOTE — TELEPHONE ENCOUNTER
Date:  25-MAR-2022 @ 6:15pm  Study: Phase 3b Study to Evaluate the Long-Term Safety and Efficacy of Elagolix in Combination with Estradiol/Norethindrone Acetate for the Management of Heavy Menstrual Bleeding Associated with Uterine Fibroids in Premenopausal Women.           Sponsor:  [employer]   IRB/Protocol #: 2017.414 (Approved 01/12/2022)  : Lisa Stewart MD  Sub-Investigators:  Magnus Roa MD and Ant Briscoe MD  Site Number: [phone]   Subject #: [id number]  Visit:  Treatment Month 31 Telephone Study Visit      is a Phase 3b Study to Evaluate the Long-Term Safety and Efficacy of Elagolix in Combination with Estradiol/Norethindrone Acetate for the Management of Heavy Menstrual Bleeding Associated with Uterine Fibroids in Premenopausal Women.           Assessment for  Treatment Month 31 Telephone Contact Summary:     Research participant contacted [facility] to conduct Treatment Month 31 Phone Visit.     Inform Consent Form ICF)  Re-Consent Documentation:  Alden Hanna was re-consented on 31-JAN-2022 (IRB #: 2017.414 approved 01/12/2022).       Treatment Month 31 Subject Self-Administered Home Urine Pregnancy Test:  Pregnancy test performed by Subject at home on 25-MAR-2022 @ 7:30am    Result: Negative.    [facility] Pregnancy Urine   Lot No.: [id number]  Exp. Date:  2022-APR      Treatment Month 31 Contraception Counseling/Dispensing:    Contraception counseling was performed.  There has not been a change/new method since last visit. Subject was reminded that she can not become pregnant while participating in the study. Subject expressed understanding.     Treatment Month 31 Concomitant Medications:   Concomitant medications were assessed.  There were no changes to Subject's concomitants since her last visit on 25-FEB-2022.     Treatment Month 31 Adverse Event Monitoring:  It should be noted that subject is not reporting any adverse events for Month 31 Phone Visit.   "      Subject's Month 31 Telephone Study Visit will be documented in Elidia and Radny.       Subject [id number] Upcoming Month 32 Study Visits:     Month 31 Phone Visit.  Sr. GOINS will contact Subject to schedule.     Ms. Hanna expressed she understood the scheduling of her upcoming visits.     I asked Ms. Hanna did she have any other questions, comments and/or concerns and she stated "NO".     ______________________________________________      Subject's stipend of $25 for Treatment Month 31 Telephone Visit will be automatically generated on her ClinCard.   "

## 2022-04-25 ENCOUNTER — TELEPHONE (OUTPATIENT)
Dept: RESEARCH | Facility: OTHER | Age: 37
End: 2022-04-25

## 2022-04-25 NOTE — TELEPHONE ENCOUNTER
Date:  25-MAR-2022 @ 6:15pm  Study: Phase 3b Study to Evaluate the Long-Term Safety and Efficacy of Elagolix in Combination with Estradiol/Norethindrone Acetate for the Management of Heavy Menstrual Bleeding Associated with Uterine Fibroids in Premenopausal Women.           Sponsor:  [employer]   IRB/Protocol #: 2017.414 (Approved 01/12/2022)  : Lisa Stewart MD  Sub-Investigators:  Magnus Roa MD and Ant Briscoe MD  Site Number: [id number]   Subject #: [id number]  Visit:  Treatment Month 32 Telephone Study Visit      is a Phase 3b Study to Evaluate the Long-Term Safety and Efficacy of Elagolix in Combination with Estradiol/Norethindrone Acetate for the Management of Heavy Menstrual Bleeding Associated with Uterine Fibroids in Premenopausal Women.           Assessment for  Treatment Month 32 Telephone Contact Summary:     Research participant contacted [facility] to conduct Treatment Month 31 Phone Visit.     Inform Consent Form ICF)  Re-Consent Documentation:  Alden Hanna was re-consented on 31-JAN-2022 (IRB #: 2017.414 approved 01/12/2022).       Treatment Month 32Subject Self-Administered Home Urine Pregnancy Test:  Pregnancy test performed by Subject at home on 25-APR-2022 @ 7:30am    Result: Negative.    SAS Pregnancy Urine      Treatment Month 32 Contraception Counseling/Dispensing:    Contraception counseling was performed.  There has not been a change/new method since last visit. Subject was reminded that she can not become pregnant while participating in the study. Subject expressed understanding.     Treatment Month 32 Concomitant Medications:   Concomitant medications were assessed.  There were no changes to Subject's concomitants since her last phone visit on 25-MAR-2022.     Treatment Month 32 Adverse Event Monitoring:  It should be noted that subject is not reporting any adverse events for Month 32 Phone Visit.        Subject's Month 32 Telephone Study Visit will be  "documented in Rave and OnCore.       Subject [id number] Upcoming Month 33 Study Visits:     Month 33 In-person Visit.  Sr. CRC will contact Subject to schedule.     Ms. Hanna expressed she understood the scheduling of her upcoming visits.     I asked Ms. Hanna did she have any other questions, comments and/or concerns and she stated "NO".     ______________________________________________      "

## 2022-05-31 DIAGNOSIS — Z00.6 RESEARCH STUDY PATIENT: ICD-10-CM

## 2022-06-01 ENCOUNTER — RESEARCH ENCOUNTER (OUTPATIENT)
Dept: RESEARCH | Facility: OTHER | Age: 37
End: 2022-06-01

## 2022-06-01 ENCOUNTER — TELEPHONE (OUTPATIENT)
Dept: OBSTETRICS AND GYNECOLOGY | Facility: CLINIC | Age: 37
End: 2022-06-01

## 2022-06-01 DIAGNOSIS — Z00.6 RESEARCH STUDY PATIENT: ICD-10-CM

## 2022-06-01 NOTE — TELEPHONE ENCOUNTER
----- Message from Arun Mccarthy sent at 2022  8:18 PM CDT -----  Regardin pending orders for [employer]  IP  Hi Dr. Stewart,    I hope this message finds you and your family doing well.  I pended the IP orders for [employer] .  Can you please sign the orders?  Alden is scheduled for her Month 33 study visit on tomorrow.    Hopefully, you will be able to see them and sign......    Thanks,   Arun

## 2022-06-01 NOTE — RESEARCH
Date:  01-JUN-2022 @ 10:15am  Study: Phase 3b Study to Evaluate the Long-Term Safety and Efficacy of Elagolix in Combination with Estradiol/Norethindrone Acetate for the Management of Heavy Menstrual Bleeding Associated with Uterine Fibroids in Premenopausal Women.           Sponsor:  [employer]   IRB/Protocol #: 2017.414 (Approved 03/09/2021)  : Lisa Stewart MD  Sub-Investigators:  Magnus Roa MD and Ant Briscoe MD  Site Number: [phone]   Visit:  Year 3 Month 33 Treatment Study Visit      ___________________________________________________________________________        Year 3 Month 33 Treatment Vital Signs taken on 01-JUN-2022 at 10:33am             Systolic blood pressure:  142 mm Hg               Diastolic blood pressure:  89 mm Hg              Pulse:  91 beats/min              Respiratory Rate:  16 (breaths/min)             Temperature:  98.2 F              Weight:  106.5 kg              Height:           Clinical Safety Labs:  The following Samples were collected at 11:27am and processed as per protocol.  Samples will be shipped via [employer].    Tracking #: [id number]   Confirmation #:  [id number]              Lipid Panel and Subset of Chemistry Labs:  LANIE, AST, Bilirubin and Alkaline Phosphatase                 Urine Pregnancy Test:  Urine pregnancy test was administered @ 10:15am before IP was dispensed.  Test Result:  Negative     Contraception Counseling/Dispense Contraceptives as Necessary:  Contraception counseling was conducted.  There has not been a change/new method since last visit.  Subject is not required to use dual contraception methods since she practices total abstinence from sexual intercourse, as the preferred lifestyle of the subject. Subject was reminded that periodic abstinence is not acceptable.     Birth Control Attestation:  Since subject is practicing total abstinence she is not required to sign attestation, unless her sexual activity  status changes during her participation in this study.     C-SSRS- Since Last Visit:  BUSTER Mccarthy completed C-SSRS on ePRO.     Interactive Response Technology (IRT):  BUSTER Mccarthy entered Month 33 study visit in [facility].       Study Drug Administration / Dispense Study Drug:  Subject was dispensed the following study drug kits below:                                                             Subject Dispense Kit Month 33     Visit ID Dispensing Date Kit Desc Lot ID Info Kit ID B/D Qty     Month 33 (Open Label) 01-JUN2022 Year 2 Open label Estradiol / Norethindrone Acetate 1.0mg/0.5mg  [id number]    [id number] D 1     Month 33 (Open Label) 01-JUN-2022 Year 2 Open label Elagolix 300mg BID [id number]    [id number] D 1                                Study Drug Accountability:  Subject returned the following IP Month 30 study drug kits. Refer to Subject Dispense/Return Report for Site [facility] for study drug accountability.                                  Subject Dispense Kit Month 30     Visit ID Dispensing Date Kit Desc Lot ID Kit ID B/D Qty   Month 30   (Open Label) 25-FEB-2022 Year 2 Open label Elagolix 300mg BID      [id number] D 1         Month 30  (Open Label)          25-FEB-2022 Year 2 Open label Estradiol / Norenthindrone Acetate 1.0 mg/0.5mg [id number] D 1                                             PK (Last 4) Dosing Dates & Times:  Subject reported dates/times noting PK Dosing Dates & Times for Month 30 IP dispensed.                                     Adverse Event Monitoring:  Subject did not report any adverse events during this visit.  Dr. Stewart has conducted all AE visits and assessed all AEs reported.     Assessment Review/Update Concomitant Medications:  BUSTER Mccarthy reviewed/updated concomitant medications with subject and updated log.     Subject was reminded of the next scheduled upcoming visits:      Subject will be contacted with upcoming visits.  Month 34 - Telephone Visit:  TBD  Month  "35 - Telephone Visit:  TBD  Month 36 - Onsite Visit:  TBD     After  completing Month 33 study visit, BUSTER Mccarthy asked subject if she had any questions, comments and/or concerns.  Subject stated "NO".   I reminded Subject that I will contact her for her upcoming Month 34 phone visit.   $75 for Treatment Month 33 will be placed on subject's ClinCard automatically by [employer].       "

## 2022-07-07 ENCOUNTER — TELEPHONE (OUTPATIENT)
Dept: RESEARCH | Facility: OTHER | Age: 37
End: 2022-07-07

## 2022-07-07 NOTE — TELEPHONE ENCOUNTER
Date:  07-JUL-2022 @ 08:25am  Study: Phase 3b Study to Evaluate the Long-Term Safety and Efficacy of Elagolix in Combination with Estradiol/Norethindrone Acetate for the Management of Heavy Menstrual Bleeding Associated with Uterine Fibroids in Premenopausal Women.             Sponsor:  [id number]  IRB/Protocol #: 2017.414 (Approved 01/12/2022)  : Lisa Stewart MD  Sub-Investigators:  Magnus Roa MD and Ant Briscoe MD  Site Number: [phone]     Subject #: [id number]  Visit:  Treatment Month 34 Telephone Study Visit     [id number] is a Phase 3b Study to Evaluate the Long-Term Safety and Efficacy of Elagolix in Combination with Estradiol/Norethindrone Acetate for the Management of Heavy Menstrual Bleeding Associated with Uterine Fibroids in Premenopausal Women.           Assessment for  Treatment Month 34 Telephone Contact Summary:     Research participant contacted [facility] to conduct Treatment Month 34 Phone Visit.      Treatment Month 34 Subject Self-Administered Home Urine Pregnancy Test:  Pregnancy test performed by Subject at home on 07-JUL-2022 @ 7:30am    Result: Negative.    SAS Pregnancy Urine      Treatment Month 34 Contraception Counseling/Dispensing:    Contraception counseling was performed.  There has not been a change/new method since last visit. Subject was reminded that she can not become pregnant while participating in the study. Subject expressed understanding.     Treatment Month 34 Concomitant Medications:   Concomitant medications were assessed.  There were no changes to Subject's concomitants since her last Month 33 clinic visit on 01-JUN-2022..     Treatment Month 34 Adverse Event Monitoring:  It should be noted that subject is not reporting any adverse events for Month 34 Phone Visit.        Subject's Month 34 Telephone Study Visit will be documented in Elidia and Randy.       Subject [id number] Upcoming Month 35 Study Visits:     Month 35 Phone Visit.  . BUSTER will  "contact Subject to schedule.     Ms. Hanna expressed she understood the scheduling of her upcoming visits.     I asked Ms. Hanna did she have any other questions, comments and/or concerns and she stated "NO".  "

## 2022-07-26 ENCOUNTER — TELEPHONE (OUTPATIENT)
Dept: RESEARCH | Facility: OTHER | Age: 37
End: 2022-07-26

## 2022-07-26 NOTE — TELEPHONE ENCOUNTER
Date:  26-JUL-2022 @ 05:33pm  Study: Phase 3b Study to Evaluate the Long-Term Safety and Efficacy of Elagolix in Combination with Estradiol/Norethindrone Acetate for the Management of Heavy Menstrual Bleeding Associated with Uterine Fibroids in Premenopausal Women.           Sponsor:  [id number]  IRB/Protocol #: 2017.414 (Approved 01/12/2022)  : Lisa Stewart MD  Sub-Investigators:  Magnus Roa MD and Ant Briscoe MD  Site Number: [phone]   Subject #: [id number]  Visit:  Treatment Month 35 Telephone Study Visit      is a Phase 3b Study to Evaluate the Long-Term Safety and Efficacy of Elagolix in Combination with Estradiol/Norethindrone Acetate for the Management of Heavy Menstrual Bleeding Associated with Uterine Fibroids in Premenopausal Women.           Assessment for  Treatment Month 35 Telephone Contact Summary:     Research participant contacted [facility] to conduct Treatment Month 35 Phone Visit.      Treatment Month 35 Subject Self-Administered Home Urine Pregnancy Test:  Pregnancy test performed by Subject at home on 26-JUL-2022 @ 7:30am    Result: Negative.    SAS Pregnancy Urine      Treatment Month 35 Contraception Counseling/Dispensing:    Contraception counseling was performed.  There has not been a change/new method since last visit. Subject was reminded that she can not become pregnant while participating in the study. Subject expressed understanding.     Treatment Month 35 Concomitant Medications:   Concomitant medications were assessed.  There were no changes to Subject's concomitants since her last Month 34 clinic visit on 07JUL-..     Treatment Month 35 Adverse Event Monitoring:  It should be noted that subject is not reporting any adverse events for Month 35 Phone Visit.        Subject's Month 35 Telephone Study Visit will be documented in Elidia and Randy.       Subject [id number] Upcoming Month 35 Study Visits:     Month 35 Phone Visit.  Sr. CRC will contact  "Subject to schedule.     Ms. Hanna expressed she understood the scheduling of her upcoming visits.     I asked Ms. Hanna did she have any other questions, comments and/or concerns and she stated "NO".  "

## 2022-08-10 DIAGNOSIS — Z00.6 RESEARCH STUDY PATIENT: Primary | ICD-10-CM

## 2022-08-11 ENCOUNTER — TELEPHONE (OUTPATIENT)
Dept: ADMINISTRATIVE | Facility: OTHER | Age: 37
End: 2022-08-11

## 2022-09-23 ENCOUNTER — NURSE TRIAGE (OUTPATIENT)
Dept: ADMINISTRATIVE | Facility: CLINIC | Age: 37
End: 2022-09-23

## 2022-09-23 NOTE — TELEPHONE ENCOUNTER
Reason for Disposition   Shingles rash (matches SYMPTOMS) and onset within past 72 hours    Additional Information   Negative: Difficult to awaken or acting confused (e.g., disoriented, slurred speech)   Negative: Sounds like a life-threatening emergency to the triager   Negative: Shingles rash of face and eye pain or blurred vision   Negative: Shingles rash on the eyelid or tip of the nose   Negative: Shingles rash and spots start appearing other places on body   Negative: Patient sounds very sick or weak to the triager   Negative: Shingles rash (matches SYMPTOMS) and weak immune system (e.g., HIV positive, cancer chemotherapy, chronic steroid treatment, splenectomy) and NOT taking antiviral medication   Negative: Shingles rash of face and facial weakness   Negative: Shingles rash of face or ear and earache or ringing in the ear   Negative: Fever > 100.4 F (38.0 C)   Negative: SEVERE pain (e.g., excruciating)    Protocols used: Shingles-A-OH    Pt stated a family friend that is a medical professional told her she has shingles and should see a doctor.    Pt advised per triage protocol, and triage care advice for shingles. Instructed to call OOC back for new or worsening symptoms.     Pt verbalized understanding. Pt stated she does not have insurance and accepted an [facility] virtual visit referral.

## 2022-10-20 ENCOUNTER — TELEPHONE (OUTPATIENT)
Dept: OBSTETRICS AND GYNECOLOGY | Facility: CLINIC | Age: 37
End: 2022-10-20

## 2022-10-20 NOTE — TELEPHONE ENCOUNTER
----- Message from Juan Pablo Long MA sent at 10/19/2022  6:05 PM CDT -----  Hey! When are you available to meet with this patient?

## 2022-10-21 NOTE — TELEPHONE ENCOUNTER
Is there another day she is available. I am not at the hospital on Wednesdays.     I can make most Mondays, Thursdays (before or after clinic) or some Fridays work

## 2022-12-07 DIAGNOSIS — Z00.6 RESEARCH STUDY PATIENT: Primary | ICD-10-CM

## 2022-12-07 RX ORDER — FUROSEMIDE 20 MG/1
20 TABLET ORAL 2 TIMES DAILY
Status: CANCELLED | OUTPATIENT
Start: 2022-12-07

## 2022-12-08 ENCOUNTER — RESEARCH ENCOUNTER (OUTPATIENT)
Dept: RESEARCH | Facility: OTHER | Age: 37
End: 2022-12-08

## 2022-12-08 ENCOUNTER — HOSPITAL ENCOUNTER (OUTPATIENT)
Dept: RADIOLOGY | Facility: OTHER | Age: 37
Discharge: HOME | End: 2022-12-08
Attending: OBSTETRICS & GYNECOLOGY

## 2022-12-08 DIAGNOSIS — Z00.6 RESEARCH STUDY PATIENT: ICD-10-CM

## 2022-12-08 PROCEDURE — 77080 DXA BONE DENSITY AXIAL: CPT | Mod: TC

## 2022-12-08 PROCEDURE — 77080 DEXA BONE DENSITY SPINE HIP: ICD-10-PCS | Mod: 26,,, | Performed by: RADIOLOGY

## 2022-12-08 PROCEDURE — 77080 DXA BONE DENSITY AXIAL: CPT | Mod: 26,,, | Performed by: RADIOLOGY

## 2022-12-08 NOTE — RESEARCH
Date:  08-DEC-2022 @ 11:45am  Study: Phase 3b Study to Evaluate the Long-Term Safety and Efficacy of Elagolix in Combination with Estradiol/Norethindrone Acetate for the Management of Heavy Menstrual Bleeding Associated with Uterine Fibroids in Premenopausal Women.           Sponsor:  [employer]   IRB/Protocol #: 2017.414 (Approved 03/09/2021)  : Lisa Stewart MD  Sub-Investigators:  Magnus Roa MD and Ant Briscoe MD  Site Number: [phone]   Visit:  Year 3 Month 36 Treatment Study Visit      ___________________________________________________________________________      Inform Consent Form ICF)  Re-Consent Documentation:  Before any activities or procedures were conducted, BUSTER Mccarthy met with Alden Hanna (Subject # [id number]) on 08-DEC-2022 to re-consent Subject.  BUSTER Mccarthy reviewed amended consent (Amendment 6) for Kalin Study  (IRB #: 2017.414 approved 11-JUL-2022) with Subject.  Ample time was given for Subject to review and ask further questions before signing consent.  The Subject verbally agreed to continue her participation in the the study and signed Informed Consent (IRB #: 2017.414 approved 11-JUL-2022).  Subject did not want a hard copy of signed consent and stated that she could review the consent if needed since it will be uploaded into Epic's .     NOTE:  On 30-NOV-2022, I reached out to Kalin Stokes Jr. to confirm if I should proceed with conducting Month 36 Study visit due to it being out of window.  Mr. Stokes replied that per the sponsor I should proceed with conducting the scheduled Month 36 study visit.    Month 36 Study Visit expected date was 28-AUG-2022.  Today, Subject appeared in the Clinical Trials Unit for her Month 36 study visit.  Subject is 102 days late for Month 36 Study Visit (See table below).  Subject rescheduled several appointments due to her being out of town family emergencies, having the Shingles, being out  [facility] for holidays and her work schedule.      28-AUG-2022          Status Missed Visit ID Expected Visit Date Days Late    RANDOMIZED Month 36 (Open Label) 28-Aug-2022 102        Year 3 Month 36 Treatment Vital Signs taken on 08-DEC-2022 @ 12:14pm             Systolic blood pressure:  132 mm Hg               Diastolic blood pressure:  92 mm Hg              Pulse: 80 beats/min              Respiratory Rate:  16 (breaths/min)             Temperature:  98.1 F              Weight:  101 kg              Height: NA      DXA:  Subject's Month 36 DXA was performed in the Imaging Department.  Subject reported with disc of images which will be shipped to [facility] via [employer] on 09-DEC-2022.  Tracking #:  [id number]     Clinical Safety Labs:  The following Samples were collected at 12:45pm and processed as per protocol.  Samples will be shipped via [employer]/U[employer].    Tracking #: [id number]   Confirmation #:  [id number]                Chemistry Panel, Lipd Panel, Hematology & Differential Panel, Urine Macro Panel, ELAG/COLLINS PK Plasma, E2 PD                 Urine Pregnancy Test:  Urine pregnancy test was administered @ 12:25pm before IP was dispensed.  Test Result:  Negative     Contraception Counseling/Dispense Contraceptives as Necessary:  Contraception counseling was conducted.  There has not been a change/new method since last visit.  Subject is not required to use dual contraception methods since she practices total abstinence from sexual intercourse, as the preferred lifestyle of the subject. Subject was reminded that periodic abstinence is not acceptable.     Birth Control Attestation:  Since subject is practicing total abstinence she is not required to sign attestation, unless her sexual activity status changes during her participation in this study.     C-SSRS- Since Last Visit:  BUSTER Mccarthy completed C-SSRS on ePRO.  ePRO was handed to study participant to complete her  questionnaire.  NOTE:  Several attempts were made to transmit data on ePRO, but were unsuccessful.       Interactive Response Technology (IRT):  BUSTER Mccarthy entered Month 36 study visit in [facility].       Study Drug Administration / Dispense Study Drug:  Subject was dispensed the following study drug kits below:                                                        Subject Dispense Kit Month 36     Visit ID Dispensing Date Kit Desc Lot ID Info Kit ID B/D Qty     Month 36 (Open Label) 08-DEC-2022 Year 2 Open label Estradiol / Norethindrone Acetate 1.0mg/0.5mg  [id number]   [id number] D 1     Month 36 (Open Label) 08-DEC-2022 Year 2 Open label Elagolix 300mg BID [id number]    [id number] D 1                                                          Study Drug Accountability:  Subject returned the following IP Month 33 study drug kits. Refer to Subject Dispense/Return Report for Site [facility] for study drug accountability.                                                        Subject Dispense Kit Month 33     Visit ID Dispensing Date Kit Desc Lot ID Info Kit ID B/D Qty     Month 33 (Open Label) 01-JUN2022 Year 2 Open label Estradiol / Norethindrone Acetate 1.0mg/0.5mg  [id number]    [id number] D 1     Month 33 (Open Label) 01-JUN-2022 Year 2 Open label Elagolix 300mg BID [id number]    [id number] D 1                                                      PK (Last 4) Dosing Dates & Times:  Subject did not capture the PK Dosing Dates & Times for Month 33 IP dispensed.  NOTE:  Subject reported that the last dosage of IP she took was on 09-OCT-2022.  Subject stated that she will start taking the IP again.                                 Adverse Event Monitoring:  Subject would like it to be noted that she thinks the IP is causing her to have elevated  blood pressure readings.  This will be noted on her AE Log and will be assessed by Dr. Ventura when she is scheduled to have her Month 36 Endometrial Biopsy.     Assessment Review/Update  "Concomitant Medications:  BUSTER Mccarthy reviewed/updated concomitant medications with subject and updated log.     Subject was reminded of the next scheduled upcoming visits:      Subject will be contacted with upcoming visits.  Month 37 - Telephone Visit:  TBD  Month 38 - Telephone Visit:  TBD  Month 39 - Onsite Visit:  TBD     After  completing Month 36 study visit, BUSTER Mccarthy asked subject if she had any questions, comments and/or concerns.  Subject stated "NO".   I reminded Subject that I will contact her for her upcoming Month 37 phone visit.   $75 for Treatment Month 36 visit and $50 for her DXA which was performed prior to this on-site visit.  $125 will be placed on subject's ClinCard automatically by [employer].     Subject will also be given a stipend for the following procedures once they are performed:  Ultrasound- $50  Endometrial Biopsy- $50  Recognition Stipend for Month 36 Completion $200                "

## 2023-01-05 DIAGNOSIS — Z00.6 RESEARCH STUDY PATIENT: Primary | ICD-10-CM

## 2023-01-16 NOTE — RESEARCH
Inform Consent Form ICF)  Re-Consent Documentation: Before any activities or procedures were conducted, BUSTER Mccarthy met with Alden Hanna on 20-NOV-2019 @ 4:30pm to re-consent Subject.  BUSTER Mccarthy reviewed amended consent for [employer] Study  (IRB #: 2017.414 approved 12-Nov-2019) with Subject.  Ample time was given for Subject to review, ask further questions before signing consent.  The Subject verbally agreed to continue her participation in the the study and signed Informed Consent (IRB #: 2017.414 approved 11/12/2019).  A copy of signed consent was given to Subject and uploaded into Epic.      Date:  06-NOV-2020 @ 11:30am  Study: Phase 3b Study to Evaluate the Long-Term Safety and Efficacy of Elagolix in Combination with Estradiol/Norethindrone Acetate for the Management of Heavy Menstrual Bleeding Associated with Uterine Fibroids in Premenopausal Women.           Sponsor:  [employer]   IRB/Protocol #: 2017.414 (Approved 01/22/2019)  : Lisa Stewart MD  Sub-Investigators:  Magnus Roa MD and Ant Briscoe MD  Site Number: [id number]   Visit:  Month 12 Treatment Study Visit (Pelvic Ultrasound: TAU, TVU)            Pelvic Ultrasound: TAU, TVU- Subject reported.  Pelvic ultrasound performed per Imaging protocol by Mai Rosales, [address].    $50 will automatically placed on ClinCard for Ultrasound procedure.     Topical Sulfur Applications Pregnancy And Lactation Text: This medication is Pregnancy Category C and has an unknown safety profile during pregnancy. It is unknown if this topical medication is excreted in breast milk.

## 2023-01-24 ENCOUNTER — TELEPHONE (OUTPATIENT)
Dept: RESEARCH | Facility: OTHER | Age: 38
End: 2023-01-24

## 2023-01-25 NOTE — TELEPHONE ENCOUNTER
Date:  24-JAN-2023 @ 6:16pm  Study: Phase 3b Study to Evaluate the Long-Term Safety and Efficacy of Elagolix in Combination with Estradiol/Norethindrone Acetate for the Management of Heavy Menstrual Bleeding Associated with Uterine Fibroids in Premenopausal Women.           Sponsor:  [facility] [facility]  IRB/Protocol #: 2017.414 (Approved 01/12/2022)  : Lisa Stewart MD  Sub-Investigators:  Magnus Roa MD and Ant Briscoe MD  Site Number: [id number]   Subject #: [id number]  Visit:  Treatment Month 37 Telephone Study Visit      is a Phase 3b Study to Evaluate the Long-Term Safety and Efficacy of Elagolix in Combination with Estradiol/Norethindrone Acetate for the Management of Heavy Menstrual Bleeding Associated with Uterine Fibroids in Premenopausal Women.           Assessment for  Treatment Month 37 Telephone Contact Summary:     After conference call with DAISY Stokes Jr. For the [employer] [id number] research study.  He instructed me to conduct the Month 37 Phone Visit today and follow-up with the Month 38 Phone Visit on 01-FEB-2023 to bring research participant up to date with study visits since her Month 36 study visit has been delayed due to her being sick and going out of town for a family emergency.    Research participant was contacted to conduct Month Month 37 Phone Visit.  Ms. Hanna answered call and stated that she was available to conduct visit over phone.      Treatment Month 37 Subject Self-Administered Home Urine Pregnancy Test:  Pregnancy test performed by Subject at home on 24-JAN-2022 @ 5:48pm    Result: Negative.    SAS Pregnancy Urine      Treatment Month 37 Contraception Counseling/Dispensing:    Contraception counseling was conducted.  There has not been a change/new method since last visit. Subject was reminded that she can not become pregnant while participating in the study. Subject expressed understanding.     Treatment Month 37 Concomitant Medications:  "  Concomitant medications were assessed.  There were no changes to Subject's concomitants since her last Month 36 in-clinic visit on 08-DEC-2022.     Treatment Month 37 Adverse Event Monitoring:  It should be noted that subject is not reporting any adverse events for her Month 37 Phone Visit being conducted today.        Subject's Month 37 Telephone Study Visit will be documented in Rave and OnCore.       Subject [id number] Upcoming Month 38 Study Visits:     Month 38 Phone Visit.  Ms. Hanna will be contacted on 01-FEB-2022 to have her Month 38 phone visit conducted.  It should be noted that DAISY Stokes requested that the visit be done within a short time period due to Subject's Month 36 not being completed due to subject being sick and out of town due to a family emergency.  Subject reported on 08-DEC-2022 to have blood sample collected, IP dispensed and DXA performed.      Her physical exam, assessment of adverse events and endometrial biopsy performed by Dr. Stewart has not been completed due to subject having to reschedule her visit due to her being sick and out of town.     Ms. Hanna expressed she understood the scheduling of her upcoming visits.     I asked Ms. Hanna did she have any other questions, comments and/or concerns and she stated "NO".  "

## 2023-02-01 ENCOUNTER — TELEPHONE (OUTPATIENT)
Dept: RESEARCH | Facility: OTHER | Age: 38
End: 2023-02-01

## 2023-02-01 NOTE — TELEPHONE ENCOUNTER
Date:  01-FEB-2023 @ 3:03pm  Study: Phase 3b Study to Evaluate the Long-Term Safety and Efficacy of Elagolix in Combination with Estradiol/Norethindrone Acetate for the Management of Heavy Menstrual Bleeding Associated with Uterine Fibroids in Premenopausal Women.           Sponsor:  [employer] [facility]  IRB/Protocol #: 2017.414 (Approved 01/12/2022)  : Lisa Stewart MD  Sub-Investigators:  Magnus Roa MD and Ant Briscoe MD  Site Number: [phone]   Subject #: [id number]  Visit:  Treatment Month 38 Telephone Study Visit     [id number] is a Phase 3b Study to Evaluate the Long-Term Safety and Efficacy of Elagolix in Combination with Estradiol/Norethindrone Acetate for the Management of Heavy Menstrual Bleeding Associated with Uterine Fibroids in Premenopausal Women.           Assessment for  Treatment Month 38 Telephone Contact Summary:     Per conference call with Jr. DAISY Collier for [employer] 6-283 research study, he instructed me to conduct Month 38 Phone Visit on 01-FEB-2023 to bring research participant up to date with study visits since her Month 36 study visit has been delayed due to her being sick and going out of town for a family emergency.     Research participant was contacted to conduct Month 38 Phone Visit.  Ms. Hanna answered call and stated that she was available to conduct visit over phone.      Treatment Month 38 Subject Self-Administered Home Urine Pregnancy Test:  Pregnancy test performed by Subject at home on 01-FEB-2023 @ 7:15am   Result: Negative.    SAS Pregnancy Urine      Treatment Month 38 Contraception Counseling/Dispensing:    Contraception counseling was conducted.  There has not been a change/new method since last visit. Subject was reminded that she can not become pregnant while participating in the study. Subject expressed understanding.     Treatment Month 38 Concomitant Medications:   Concomitant medications were assessed.  There were no changes to  "Subject's concomitants since her last Month 36 in-clinic visit on 08-DEC-2022.     Treatment Month 38 Adverse Event Monitoring:  It should be noted that subject is not reporting any adverse events for her Month 38 Phone Visit being conducted today.        Subject's Month 38 Telephone Study Visit will be documented in Rave and OnCore.       Subject [id number] Upcoming Month 39 Study Visits:     Month 39 in-person Visit.  Ms. Hanna will be contacted at a later time with date of her scheduled Month 39 study visit.  It should be noted that DAISY Stokes requested that the Months 27 and 38 visits be done within a short time period due to Subject's Month 36 not being completed due to subject being sick and out of town due to a family emergency.       Her physical exam, assessment of adverse events and endometrial biopsy performed by Dr. Ventura has not been completed due to subject having to reschedule her visit due to her being sick and out of town.  NOTE:  Subject appeared to Dr. Ventura's office on 27-JAN-2023.  While in exam room waiting, Dr. Ventura was called to the emergency department.  Ms. Hanna waited about 20 minutes and contacted me to say that she was leaving and will wait to hear back from with her rescheduled date/time to complete her Month 36 visit.     I asked Ms. Hanna did she have any other questions, comments and/or concerns and she stated "NO".  Ms. Hanna further expressed she understood the scheduling of her upcoming visits.    Ms. Hanna's stipend of $25 will be automatically placed on her clincard.  Her visit will also be noted in OnCore.  "

## 2023-02-03 ENCOUNTER — HOSPITAL ENCOUNTER (OUTPATIENT)
Dept: PERINATAL CARE | Facility: OTHER | Age: 38
Discharge: HOME | End: 2023-02-03
Attending: OBSTETRICS & GYNECOLOGY

## 2023-02-03 DIAGNOSIS — D25.1 INTRAMURAL, SUBMUCOUS, AND SUBSEROUS LEIOMYOMA OF UTERUS: ICD-10-CM

## 2023-02-03 DIAGNOSIS — D25.0 INTRAMURAL, SUBMUCOUS, AND SUBSEROUS LEIOMYOMA OF UTERUS: ICD-10-CM

## 2023-02-03 DIAGNOSIS — D25.2 INTRAMURAL, SUBMUCOUS, AND SUBSEROUS LEIOMYOMA OF UTERUS: ICD-10-CM

## 2023-02-11 ENCOUNTER — TELEPHONE (OUTPATIENT)
Dept: OBSTETRICS AND GYNECOLOGY | Facility: CLINIC | Age: 38
End: 2023-02-11

## 2023-02-11 NOTE — TELEPHONE ENCOUNTER
When does she need the appointment by? When is she going to be in town? You can offer my next available if that works for when she needs to be examined per the research protocol.

## 2023-02-11 NOTE — TELEPHONE ENCOUNTER
----- Message from Juan Pablo Long MA sent at 2/9/2023  5:29 PM CST -----  Can you offer her another appointment at your next available?

## 2023-02-13 DIAGNOSIS — Z00.6 RESEARCH STUDY PATIENT: ICD-10-CM

## 2023-02-24 ENCOUNTER — TELEPHONE (OUTPATIENT)
Dept: OBSTETRICS AND GYNECOLOGY | Facility: CLINIC | Age: 38
End: 2023-02-24

## 2023-02-24 NOTE — TELEPHONE ENCOUNTER
Contacted patient to see if she can come in today to be seen with Dr. Stewart at 1pm. Left voicemail for patient to contact the office back.

## 2023-03-30 ENCOUNTER — TELEPHONE (OUTPATIENT)
Dept: OBSTETRICS AND GYNECOLOGY | Facility: CLINIC | Age: 38
End: 2023-03-30

## 2023-03-30 ENCOUNTER — RESEARCH ENCOUNTER (OUTPATIENT)
Dept: RESEARCH | Facility: OTHER | Age: 38
End: 2023-03-30

## 2023-03-30 NOTE — TELEPHONE ENCOUNTER
Contacted the patient to inquire if she can come in for an earlier appointment time. Patient stated that she cannot and cannot leave her job until 3:15pm. Patient informed that Dr. Stewart will be informed. Patient verbalized understanding.

## 2023-03-31 ENCOUNTER — TELEPHONE (OUTPATIENT)
Dept: RESEARCH | Facility: OTHER | Age: 38
End: 2023-03-31

## 2023-03-31 NOTE — RESEARCH
"Date:  30-MAR-2023 @ 4:00pm  Study: Phase 3b Study to Evaluate the Long-Term Safety and Efficacy of Elagolix in Combination with Estradiol/Norethindrone Acetate for the Management of Heavy Menstrual Bleeding Associated with Uterine Fibroids in Premenopausal Women.           Sponsor:  [employer]   IRB/Protocol #: 2017.414 (Approved 03/09/2021)  : Lisa Stewart MD  Sub-Investigators:  Magnus Roa MD and Ant Briscoe MD  Site Number: [phone]   Visit:  Year 3 Month 36 Treatment Study Visit at [facility] location with Dr. Lisa Ventura, PI of [employer]  research study.  This visit is a rescheduled visit from 27-JAN-2023.  Subject appeared late for visit and Dr. Ventura was unable to see her.      This Epic note will capture assessments and procedures conducted by Dr. Lisa Ventura and her nursing staff.     ___________________________________________________________________________         Year 3 Month 36 Treatment Vital Signs taken on 30-MAR-2023 @ 4:00pm    /89 Important   (BP Location: Right arm, Patient Position: Sitting, BP Method: Large (Automatic))  Pulse 96  Ht 5' 4" (1.626 m)  Wt 99.1 kg (218 lb 7.6 oz)  LMP 03/16/2023 (Exact Date)  BMI 37.50 kg/m²       The following assessments/procedures was conducted by Dr. Ventura.  For a detailed summary of assessments/procedures conducted by Dr. Ventura please refer to her Epic note.    ?  Endometrial Biopsy Collected and put in fixative at 4:35pm.  Packaged and shipped per protocol on 331-MAR-2023.  Tracking #:  [id number].  Confirmation #: [id number]  A negative urine pregnancy result should be obtained on the day of the endometrial biopsy, prior to performing the procedure.  ?  Complete Physical Examination:  Including weight (without wearing shoes)  ?  Gynecological (External Genitalia, Pelvic and Breast) Examination   ?  Adverse Events (Elevated Blood Pressure & Shingles) " Assessments by Dr. Ventura:                    Urine Pregnancy Test:  Urine pregnancy test was administered before Endometrial Biopsy was performed.  Test Result:  Negative    Subject will also be given a stipend for the following procedures once they are performed:  Endometrial Biopsy- $50  Recognition Stipend for Month 36 Completion $200    OnCore and RAVE (EDC) will be updated to reflect visit completion.

## 2023-04-06 ENCOUNTER — TELEPHONE (OUTPATIENT)
Dept: OBSTETRICS AND GYNECOLOGY | Facility: CLINIC | Age: 38
End: 2023-04-06

## 2023-04-06 ENCOUNTER — OFFICE VISIT (OUTPATIENT)
Dept: OBSTETRICS AND GYNECOLOGY | Facility: CLINIC | Age: 38
End: 2023-04-06

## 2023-04-06 DIAGNOSIS — D21.9 FIBROIDS: Primary | ICD-10-CM

## 2023-04-06 DIAGNOSIS — N97.0 OLIGO-OVULATION: ICD-10-CM

## 2023-04-06 PROCEDURE — 99215 PR OFFICE/OUTPT VISIT, EST, LEVL V, 40-54 MIN: ICD-10-PCS | Mod: 95,,, | Performed by: OBSTETRICS & GYNECOLOGY

## 2023-04-06 PROCEDURE — 99215 OFFICE O/P EST HI 40 MIN: CPT | Mod: 95,,, | Performed by: OBSTETRICS & GYNECOLOGY

## 2023-04-06 NOTE — TELEPHONE ENCOUNTER
Contacted the patient to schedule pelvic MRI and labs. Patient stated that she will call the clinic back when she is ready to schedule because she needs to contact the billing department to see how much it will be for a self-pay patient. Patient had no further questions.

## 2023-04-06 NOTE — PROGRESS NOTES
The patient location is: [place]  The chief complaint leading to consultation is: Fibroids    Visit type: audiovisual    Face to Face time with patient: 15 minutes  20 minutes of total time spent on the encounter, which includes face to face time and non-face to face time preparing to see the patient (eg, review of tests), Obtaining and/or reviewing separately obtained history, Documenting clinical information in the electronic or other health record, Independently interpreting results (not separately reported) and communicating results to the patient/family/caregiver, or Care coordination (not separately reported).         Each patient to whom he or she provides medical services by telemedicine is:  (1) informed of the relationship between the physician and patient and the respective role of any other health care provider with respect to management of the patient; and (2) notified that he or she may decline to receive medical services by telemedicine and may withdraw from such care at any time.    Notes:      CC: Symptoms related to fibroids    Alden Hanna is a 38 y.o. female  presents for a consultation for management of fibroids.      She is currently in a Phase 3 Elagolix/Estradiol and Norethindrone acetate clinical trial. She is now off of the study medication (since October). She reports cycles are now heavy. She has 2 days ov heavy bleeding. She uses a menstrual cup that she has to change every 2 hours.     She desires future fertility.       Past Medical History:   Diagnosis Date    Shingles        History reviewed. No pertinent surgical history.    Family History   Problem Relation Age of Onset    Stomach cancer Maternal Grandfather     Hypertension Father     Stroke Father     Hyperlipidemia Mother     Breast cancer Neg Hx     Colon cancer Neg Hx     Ovarian cancer Neg Hx        Social History     Tobacco Use    Smoking status: Some Days     Types: Cigars    Smokeless tobacco: Never    Tobacco  comments:     Rare use (2x year)   Substance Use Topics    Alcohol use: Yes     Comment: occasional    Drug use: No       OB History    Para Term  AB Living   0 0 0 0 0 0   SAB IAB Ectopic Multiple Live Births   0 0 0 0 0       LMP 2023 (Exact Date)     ROS:  GENERAL: Denies weight gain or weight loss. Feeling well overall.   SKIN: Denies rash or lesions.   HEAD: Denies head injury or headache.   NODES: Denies enlarged lymph nodes.   CHEST: Denies chest pain or shortness of breath.   CARDIOVASCULAR: Denies palpitations or left sided chest pain.   ABDOMEN: No abdominal pain, constipation, diarrhea, nausea, vomiting or rectal bleeding.   URINARY: No frequency, dysuria, hematuria, or burning on urination.  REPRODUCTIVE: See HPI.   HEMATOLOGIC: No easy bruisability or excessive bleeding with the exception of menstrual cycles.  MUSCULOSKELETAL: Denies joint pain or swelling.   NEUROLOGIC: Denies syncope or weakness.   PSYCHIATRIC: Denies depression, anxiety or mood swings.    PHYSICAL EXAM:  APPEARANCE: Well nourished, well developed, in no acute distress.  AFFECT: WNL, alert and oriented x 3  SKIN: No acne or hirsutism  NECK: Neck symmetric without masses or thyromegaly  CHEST: Good respiratory effect      ICD-10-CM ICD-9-CM    1. Fibroids  D21.9 215.9 BASIC METABOLIC PANEL      MRI Pelvis W WO Contrast      CANCELED: US Pelvis Comp with Transvag NON-OB (xpd      2. Oligo-ovulation  N97.0 628.0 ANTIMULLERIAN HORMONE ([facility])            Patient was counseled today on treatment options for fibroids and ovarian reserve. Because she desires future fertility and she is AMA, would recommend evaluation for ovarian reserve. [facility] ordered but recommended full evaluation with [facility]. Also because she doesn't desire fertility at this time, consider egg freezing.     Discussed fibroids and due to size and desire future fertility, recommended removing fibroids. Because of the risk of recurrence, recommended myomectomy  closer to desire to proceed. However, may need to remove to be able to access ovaries for egg retrieval. Recommended MRI to better determine size and location of fibroids in relation to ovaries.     Depending on above, will discuss treatment options for menorrhagia.    RTC in 3-4 weeks

## 2023-06-12 ENCOUNTER — CLINICAL SUPPORT (OUTPATIENT)
Dept: INFECTIOUS DISEASES | Facility: CLINIC | Age: 38
End: 2023-06-12

## 2023-06-12 ENCOUNTER — OFFICE VISIT (OUTPATIENT)
Dept: INFECTIOUS DISEASES | Facility: CLINIC | Age: 38
End: 2023-06-12

## 2023-06-12 VITALS
TEMPERATURE: 98 F | HEART RATE: 104 BPM | SYSTOLIC BLOOD PRESSURE: 146 MMHG | HEIGHT: 64 IN | DIASTOLIC BLOOD PRESSURE: 95 MMHG | WEIGHT: 221.81 LBS | BODY MASS INDEX: 37.87 KG/M2

## 2023-06-12 DIAGNOSIS — Z71.84 TRAVEL ADVICE ENCOUNTER: Primary | ICD-10-CM

## 2023-06-12 DIAGNOSIS — Z71.84 TRAVEL ADVICE ENCOUNTER: ICD-10-CM

## 2023-06-12 PROCEDURE — 99999 PR PBB SHADOW E&M-EST. PATIENT-LVL III: CPT | Mod: PBBFAC,,, | Performed by: PHYSICIAN ASSISTANT

## 2023-06-12 PROCEDURE — 90717 YELLOW FEVER VACCINE SUBQ: CPT | Mod: PBBFAC

## 2023-06-12 PROCEDURE — 99213 OFFICE O/P EST LOW 20 MIN: CPT | Mod: PBBFAC,27 | Performed by: PHYSICIAN ASSISTANT

## 2023-06-12 PROCEDURE — 99999 PR PBB SHADOW E&M-EST. PATIENT-LVL I: CPT | Mod: PBBFAC,,,

## 2023-06-12 PROCEDURE — 99402 PR PREVENT COUNSEL,INDIV,30 MIN: ICD-10-PCS | Mod: S$PBB,,, | Performed by: PHYSICIAN ASSISTANT

## 2023-06-12 PROCEDURE — 99999 PR PBB SHADOW E&M-EST. PATIENT-LVL I: ICD-10-PCS | Mod: PBBFAC,,,

## 2023-06-12 PROCEDURE — 90691 TYPHOID VACCINE IM: CPT | Mod: PBBFAC

## 2023-06-12 PROCEDURE — 99999 PR PBB SHADOW E&M-EST. PATIENT-LVL III: ICD-10-PCS | Mod: PBBFAC,,, | Performed by: PHYSICIAN ASSISTANT

## 2023-06-12 PROCEDURE — 99402 PREV MED CNSL INDIV APPRX 30: CPT | Mod: S$PBB,,, | Performed by: PHYSICIAN ASSISTANT

## 2023-06-12 PROCEDURE — 99211 OFF/OP EST MAY X REQ PHY/QHP: CPT | Mod: PBBFAC

## 2023-06-12 RX ORDER — ATOVAQUONE AND PROGUANIL HYDROCHLORIDE 250; 100 MG/1; MG/1
1 TABLET, FILM COATED ORAL DAILY
Qty: 25 TABLET | Refills: 0 | Status: SHIPPED | OUTPATIENT
Start: 2023-07-06 | End: 2023-07-31

## 2023-06-12 RX ORDER — AZITHROMYCIN 500 MG/1
1000 TABLET, FILM COATED ORAL ONCE
Qty: 2 TABLET | Refills: 0 | Status: SHIPPED | OUTPATIENT
Start: 2023-06-12 | End: 2023-06-12

## 2023-06-12 NOTE — PROGRESS NOTES
Patient received the typhoid vaccine in the left deltoid, and the yellow fever vaccine sq in the right arm. Pt tolerated well. Pt asked to wait in the clinic 15 minutes after injection in the event of an allergic reaction. Pt verbalized understanding. Pt left in NAD.

## 2023-06-12 NOTE — PROGRESS NOTES
History of Present Illness    Patient  38 y.o. female who presents today for routine pretravel consultation.  The patient reports a past medical history of none.  The patient reports the following medication allergies; none.  The patient reports the following food allergies; none .  The patient will be traveling to  [place] on 7/9/23.  The patient will be at this destination for 14 days.  The patient will be lodging at a hotel and friend's at [facility].  The has travelled to the following other countries in the past; [place], [place], [place], [place], [place], [place], [place], .  The patient reports that they up to date all their childhood vaccinations.  The patient reports receipt of the following travel related vaccinations; none.  The purpose of this trip is pleasure and friend's wedding.      Immunization History   Administered Date(s) Administered    Typhoid - ViCPs 06/12/2023    Yellow Fever 06/12/2023       Assessment:    Pre-Travel clinic assessment    Plan:  Patient specific risks:       The Patient was provided with an extensive travel guidance packet which provides travel information specific to the patient's itinerary.    The patient's immunization history was reviewed and, based on the patient's itinerary, immunizations were ordered.     -Infectious Disease risks:       Mosquito Borne pathogens:  Reviewed basic mosquito avoidance precautions including wearing long sleeve clothing and insect repellant. The patient was instructed to purchase insect repellent containing DEET or [place] and apply according to repellent label instructions. Malarone (25# tablets) was prescribed for malaria prophylaxis and possible side effects were reviewed      Food Borne pathogens:  Reviewed basic hand, food and water sanitation precautions.  Patient instructed to take hand  on their trip.  Hepatitis A rx given. Typhoid vaccine today. Azithromycin as needed for severe diarrhea. The patient was instructed to  purchase Imodium over the counter to take in case diarrhea (without blood or fever) develops.      Blood Borne Pathogens: Patient has received the hepatitis b vaccination series. Recommend f/u with pcp to check hepatitis b serologies.      STDs:  Risk of STDs reviewed with the patient.  Low risk     Routine:   Up to date on COVID/Tdap/Influenza vaccine. Recommend covid booster once able. Needs  tdap booster.     Environmental risks:   The patient was counseled on:  Precautions to minimize risk/exposure to crime and motor vehicle accidents  Precautions against development of DVT during flight  Precautions to prevent exposure to rabies and seek treatment for possible exposures  Precautions against sun exposure  Personal and travel safety    The patient was instructed to contact us if problems develop after travel.    >35 minutes of total time spent on the encounter, which includes face to face time and non-face to face time preparing to see the patient (eg, review of tests), Obtaining and/or reviewing separately obtained history, Documenting clinical information in the electronic or other health record, Independently interpreting results (not separately reported) and communicating results to the patient/family/caregiver, or Care coordination (not separately reported).

## 2023-06-28 ENCOUNTER — LAB VISIT (OUTPATIENT)
Dept: LAB | Facility: HOSPITAL | Age: 38
End: 2023-06-28
Attending: OBSTETRICS & GYNECOLOGY

## 2023-06-28 DIAGNOSIS — N97.0 OLIGO-OVULATION: ICD-10-CM

## 2023-06-28 DIAGNOSIS — D21.9 FIBROIDS: ICD-10-CM

## 2023-06-28 LAB
ANION GAP SERPL CALC-SCNC: 8 MMOL/L (ref 8–16)
BUN SERPL-MCNC: 7 MG/DL (ref 6–20)
CALCIUM SERPL-MCNC: 8.8 MG/DL (ref 8.7–10.5)
CHLORIDE SERPL-SCNC: 104 MMOL/L (ref 95–110)
CO2 SERPL-SCNC: 25 MMOL/L (ref 23–29)
CREAT SERPL-MCNC: 0.8 MG/DL (ref 0.5–1.4)
EST. GFR  (NO RACE VARIABLE): >60 ML/MIN/1.73 M^2
GLUCOSE SERPL-MCNC: 108 MG/DL (ref 70–110)
POTASSIUM SERPL-SCNC: 3.4 MMOL/L (ref 3.5–5.1)
SODIUM SERPL-SCNC: 137 MMOL/L (ref 136–145)

## 2023-06-28 PROCEDURE — 83520 IMMUNOASSAY QUANT NOS NONAB: CPT | Performed by: OBSTETRICS & GYNECOLOGY

## 2023-06-28 PROCEDURE — 36415 COLL VENOUS BLD VENIPUNCTURE: CPT | Performed by: OBSTETRICS & GYNECOLOGY

## 2023-06-28 PROCEDURE — 80048 BASIC METABOLIC PNL TOTAL CA: CPT | Performed by: OBSTETRICS & GYNECOLOGY

## 2023-06-29 LAB — MIS SERPL-MCNC: 0.91 NG/ML (ref 0.15–7.5)

## 2023-06-30 ENCOUNTER — HOSPITAL ENCOUNTER (OUTPATIENT)
Dept: RADIOLOGY | Facility: HOSPITAL | Age: 38
Discharge: HOME | End: 2023-06-30
Attending: OBSTETRICS & GYNECOLOGY

## 2023-06-30 DIAGNOSIS — D21.9 FIBROIDS: ICD-10-CM

## 2023-06-30 PROCEDURE — 25500020 PHARM REV CODE 255: Performed by: OBSTETRICS & GYNECOLOGY

## 2023-06-30 PROCEDURE — 72197 MRI PELVIS W WO CONTRAST: ICD-10-PCS | Mod: 26,,, | Performed by: RADIOLOGY

## 2023-06-30 PROCEDURE — A9585 GADOBUTROL INJECTION: HCPCS | Performed by: OBSTETRICS & GYNECOLOGY

## 2023-06-30 PROCEDURE — 72197 MRI PELVIS W/O & W/DYE: CPT | Mod: TC

## 2023-06-30 PROCEDURE — 72197 MRI PELVIS W/O & W/DYE: CPT | Mod: 26,,, | Performed by: RADIOLOGY

## 2023-06-30 RX ORDER — GADOBUTROL 604.72 MG/ML
10 INJECTION INTRAVENOUS
Status: COMPLETED | OUTPATIENT
Start: 2023-06-30 | End: 2023-06-30

## 2023-06-30 RX ADMIN — GADOBUTROL 10 ML: 604.72 INJECTION INTRAVENOUS at 06:06

## 2023-08-10 ENCOUNTER — RESEARCH ENCOUNTER (OUTPATIENT)
Dept: RESEARCH | Facility: OTHER | Age: 38
End: 2023-08-10

## 2023-08-10 NOTE — RESEARCH
Date:  10-AUG-2023 @ 9:30am  Study: Phase 3b Study to Evaluate the Long-Term Safety and Efficacy of Elagolix in Combination with Estradiol/Norethindrone Acetate for the Management of Heavy Menstrual Bleeding Associated with Uterine Fibroids in Premenopausal Women.           Sponsor:  [employer]   IRB/Protocol #: 2017.414 (Approved 03/09/2021)  : Lisa Stewart MD  Sub-Investigators:  Magnus Roa MD and Ant Briscoe MD  Site Number: [id number]     Visit:  Premature Discontinuation Year 4 Study Visit Inform Consent Form ICF)  Re-Consent Documentation:  Before any activities or procedures were conducted, BUSTER Mccarthy met with Alden Hanna (Subject # [id number]) on 10-AUGUST-2023 to re-consent Subject.  BUSTER Mccarthy reviewed amended consent (Amendment 6) for [employer] Study  (IRB #: 2017.414 approved 25-APR-2023 with Subject.  Ample time was given for Subject to review and ask further questions before signing consent.  The Subject verbally stated that she understood the protocol amendment and that today's visit would be her last visit in the study and signed Informed Consent (IRB #: 2017.414 approved 25-APR-2023).  Subject did not want to receive a hard copy of signed consent.  She stated that an uploaded copy of signed consent in Epic was sufficient.           Year 4 Premature Discontinuation Visit Signs taken on 10-AUG-2023 @ 10:30a             Systolic blood pressure:  137 mm Hg               Diastolic blood pressure:  89 mm Hg              Pulse: 79 beats/min              Respiratory Rate:  16 (breaths/min)             Temperature:  98.1 F              Weight:  100.75 kg              Height: NA     NOTE:  Premature Discontinuation prior to the time of the Treatment Month 42 does not require a Mammogram, DXA, Pelvic Ultrasound, or Pap test.  Endometrial Biopsy is not required if the subject had an endometrial biopsy within the last 6 months.  Subject had Endometrial Biopsy conducted by PI  Lisajuve Ventura on 30-MAR-2023.    Clinical Safety Labs:  The following Samples were collected at 10:35am and processed as per protocol.  Chemistry Panel, Lipid Panel, Hematology & Differential Panel, Urine Macro Panel, ELAG/COLLINS PK Plasma, E2 PD  Samples will be shipped on 11-AUG-2023 via [facility] due to package not being picked up by [employer].   It should be noted that I called to schedule a  at  around 2:00pm and was told they would pick it up before 6:00pm.  I explained to the representative that our office closes at 5:00pm and would like it to be picked up by 4:30p.  He stated that he would note 4:30pm as the  time.  I was provided the following Tracking #: [id number]   Confirmation #:  [id number].    At 5:36pm, I contacted [employer] again as the package was not picked up.  I was told by the representative that they would be there by 7:00pm to  the package.  The representative reached out to the local [employer] several times to speak with them to no avail.  All of the other [employer] drop off centers in our area closes at 6:00pm for drop off packages.    The frozen samples will be taken out of the package and placed in our -80 degree freezer until tomorrow.  The ambient samples will be placed in our secured Biobank at room temperature until scheduling of shipment tomorrow.     NOTE;  On 11-AUG-2023, samples were picked up and shipped via [employer].                   Urine Pregnancy Test:  Urine pregnancy test was administered @ 10:45am.  Test Result:  Negative    hCG RaPid Test Midstream   Lot #: [id number]  Exp:  2023-08-31     Contraception Counseling/Dispense Contraceptives as Necessary:  As this is the Subject's Premature Discontinuation visit contraception counseling and dispensing of contraceptives were not provided.       Birth Control Attestation:  As this is the Subject's Premature Discontinuation visit the Birth Control Attestation was not conducted.     C-SSRS- Since Last Visit:  CRC  Fabio completed C-SSRS on ePRO.  ePRO was handed to study participant to complete her questionnaire.  NOTE:  Several attempts were made to transmit data on ePRO, but were unsuccessful.  BUSTER Mccarthy contacted the [employer] to resolve issue.  After being on call with representative for over 30 minutes, I had to end call to attend a mandatory scheduled meeting at 3:00pm.  BUSTER Mccarthy will contact [employer] tomorrow to troubleshoot issue with transmitting date on the Slate device.  While speaking with DAISY Stokes he mentioned that several sites reported having problems with transmitting their data and were instructed to contact the [employer].     Interactive Response Technology (IRT):  BUSTER Mccarthy entered the Premature Discontinuation visit in [facility].       Study Drug Administration / Dispense Study Drug:  Since this is a Premature Discontinuation Visit due to Subject's non-compliance of taking IP, dispensing of study drug was not done.    Subject was dispensed the following study drug kits below:                                          Study Drug Accountability:  Subject failed to bring IP dispensed during Month 36 to site with her.  She will return on a later date to return all IP.  rug kits. Refer to Subject Dispense/Return Report for Site [facility] for study drug accountability.                                                        Subject Dispense Kit Month 36     Visit ID Dispensing Date Kit Desc Lot ID Info Kit ID B/D Qty     Month 36 (Open Label) 08-DEC-2022 Year 2 Open label Estradiol / Norethindrone Acetate 1.0mg/0.5mg  [id number]   [id number] D 1     Month 36 (Open Label) 08-DEC-2022 Year 2 Open label Elagolix 300mg BID [id number]    [id number] D 1                         PK (Last 4) Dosing Dates & Times:  Subject did not capture the PK Dosing Dates & Times for Month 36 IP dispensed.  Subject reported during her months 37 & 38 telephone visits that she was not taking the IP any longer due to the adverse events  "(elevated blood pressure readings, right breast larger than left) she  reported.  Please see Adverse events for further details.                                 Adverse Event Monitoring:  All AE's were reviewed with Subject during the Premature Discontinuation visit with end dates updated.  Those without end dates will be reported as ongoing at the end of the study.     Assessment Review/Update Concomitant Medications:  BUSTER Mccarthy reviewed/updated concomitant medications with subject and updated log.      After  completing the Premature Discontinuation study visit, BUSTER Mccarthy asked subject if she had any questions, comments and/or concerns.  Subject stated "NO".   I thanked Dr. Hanna for her participation in the [employer]  on behalf of Dr. Lisa Buchanan, PI and the [employer] study team.  $75 will be placed on subject's ClinCard for today's visit.     This visit will also be captured in On[employer].          "